# Patient Record
Sex: FEMALE | Race: WHITE | NOT HISPANIC OR LATINO | Employment: UNEMPLOYED | URBAN - METROPOLITAN AREA
[De-identification: names, ages, dates, MRNs, and addresses within clinical notes are randomized per-mention and may not be internally consistent; named-entity substitution may affect disease eponyms.]

---

## 2021-07-17 ENCOUNTER — HOSPITAL ENCOUNTER (EMERGENCY)
Facility: CLINIC | Age: 56
Discharge: HOME | End: 2021-07-17
Attending: STUDENT IN AN ORGANIZED HEALTH CARE EDUCATION/TRAINING PROGRAM | Admitting: STUDENT IN AN ORGANIZED HEALTH CARE EDUCATION/TRAINING PROGRAM
Payer: COMMERCIAL

## 2021-07-17 VITALS
DIASTOLIC BLOOD PRESSURE: 76 MMHG | SYSTOLIC BLOOD PRESSURE: 121 MMHG | BODY MASS INDEX: 33.31 KG/M2 | HEIGHT: 63 IN | TEMPERATURE: 99.3 F | OXYGEN SATURATION: 97 % | HEART RATE: 85 BPM | RESPIRATION RATE: 16 BRPM | WEIGHT: 188 LBS

## 2021-07-17 DIAGNOSIS — L50.9 WHEAL: ICD-10-CM

## 2021-07-17 PROCEDURE — 250N000012 HC RX MED GY IP 250 OP 636 PS 637: Performed by: STUDENT IN AN ORGANIZED HEALTH CARE EDUCATION/TRAINING PROGRAM

## 2021-07-17 PROCEDURE — 99283 EMERGENCY DEPT VISIT LOW MDM: CPT

## 2021-07-17 PROCEDURE — 250N000013 HC RX MED GY IP 250 OP 250 PS 637: Performed by: STUDENT IN AN ORGANIZED HEALTH CARE EDUCATION/TRAINING PROGRAM

## 2021-07-17 RX ORDER — PREDNISONE 20 MG/1
60 TABLET ORAL DAILY
Qty: 12 TABLET | Refills: 0 | Status: SHIPPED | OUTPATIENT
Start: 2021-07-17 | End: 2021-07-21

## 2021-07-17 RX ORDER — FAMOTIDINE 10 MG
10 TABLET ORAL ONCE
Status: COMPLETED | OUTPATIENT
Start: 2021-07-17 | End: 2021-07-17

## 2021-07-17 RX ORDER — PREDNISONE 20 MG/1
60 TABLET ORAL ONCE
Status: COMPLETED | OUTPATIENT
Start: 2021-07-17 | End: 2021-07-17

## 2021-07-17 RX ADMIN — PREDNISONE 60 MG: 20 TABLET ORAL at 01:02

## 2021-07-17 RX ADMIN — FAMOTIDINE 10 MG: 10 TABLET, FILM COATED ORAL at 01:02

## 2021-07-17 ASSESSMENT — MIFFLIN-ST. JEOR: SCORE: 1416.89

## 2021-07-17 NOTE — ED PROVIDER NOTES
"  Emergency Department Encounter         FINAL IMPRESSION:  Lip paresthesias, local skin reaction        ED COURSE AND MEDICAL DECISION MAKING   12:37 PM I met with patient to gather history and perform initial exam. ED course and treatment plan was discussed.  12:54 AM I discussed plans for discharge. Patient was agreeable with plan.    ED Course as of Jul 17 0053   Sat Jul 17, 2021   0049 55-year-old female here with concerns of allergic reaction.  Patient received a right anterior abdominal Trulicity shot on Wednesday.  States tonight when she went down to bed she felt like her tongue was painful, her throat was scratchy, she had lip tingling and she noticed a red blotchy area where she was injected on Wednesday.  No vomiting.  No abdominal pain.  Tolerating p.o.  No voice changes.  On arrival she looks well.  Vitals are stable.  Physical examination revealing a small blotchy area to her right abdomen where patient states she was injected.  No other hive-like lesions appreciated on physical examination.  Oral examination normal.  Patient states she feels like \"my tongue is on fire.\"  I do not appreciate any findings in her tongue.  Her lips are normal.  Uvula normal.  No geographic tongue.  Heart and lungs normal.  Abdomen benign.  Unsure was causing patient symptoms.  Does not appear to be in anaphylactic shock.  No swelling.  No other skin findings.  Plan for prednisone, Pepcid and discharge home            -Patient was given prednisone and Pepcid here.  Sent home with 4 more days of prednisone.  As stated above, patient has no signs or symptoms that would suggest acute anaphylactic reaction.  Shows no signs of facial edema or stridor or drooling.  Abdomen is benign other than a small 2 x 2 area of redness suggesting a wheal near the site of injection.  No abscess or induration or signs of cellulitis around the area.  -Abdominal pain or vomiting that suggest indolent allergic reaction.              At the " "conclusion of the encounter I discussed the results of all the tests and the disposition. The questions were answered. The patient or family acknowledged understanding and was agreeable with the care plan.                  MEDICATIONS GIVEN IN THE EMERGENCY DEPARTMENT:  Medications   famotidine (PEPCID) tablet 10 mg (has no administration in time range)   predniSONE (DELTASONE) tablet 60 mg (has no administration in time range)       NEW PRESCRIPTIONS STARTED AT TODAY'S ED VISIT:  New Prescriptions    No medications on file       HPI     Patient information obtained from: patient     Use of Interpretor: N/A     Anastacio Powers is a 55 year old female with no pertinent history who presents to this ED via walk in for evaluation of allergic reaction.    Patient reports developing rash on abdomen, cough and dryness in mouth and throat this morning. Prior to today, she received a Trulicity injection on 7/14. She had woken up with her throat and mouth \"feeling raw\", similar to a sore throat. Notes tingling in her lips. States that she has dentures and had to take them off due to her gums swelling. Denies nausea, vomiting, abdominal pain. Denies any urinary symptoms. No other complaints at this time.       REVIEW OF SYSTEMS:  Review of Systems   Constitutional: Negative for fever, malaise  HEENT: Negative runny nose, ear pain, neck pain. Positive for sore throat, swollen lips along with tingling, dry mouth and throat.  Respiratory: Negative for shortness of breath, cough, congestion  Cardiovascular: Negative for chest pain, leg edema  Gastrointestinal: Negative for abdominal distention, abdominal pain, constipation, vomiting, nausea, diarrhea  Genitourinary: Negative for dysuria and hematuria.   Integument: Negative skin breakdown. Positive for rash.  Neurological: Negative for paresthesias, weakness, headache.  Musculoskeletal: Negative for joint pain, joint swelling      All other systems reviewed and are " "negative.          MEDICAL HISTORY     History reviewed. No pertinent past medical history.    History reviewed. No pertinent surgical history.    Social History     Tobacco Use     Smoking status: None   Substance Use Topics     Alcohol use: None     Drug use: None       No current outpatient medications on file.          PHYSICAL EXAM     /76   Pulse 85   Temp 99.3  F (37.4  C) (Oral)   Resp 16   Ht 1.6 m (5' 3\")   Wt 85.3 kg (188 lb)   SpO2 97%   Breastfeeding No   BMI 33.30 kg/m        PHYSICAL EXAM:     General: Patient appears well, nontoxic, comfortable  HEENT: Moist mucous membranes, no tongue swelling.  No head trauma.  No midline neck pain.  Tongue is overall normal.  Do not appreciate any ulcerations, no geographic tongue.  No tongue swelling., no uvular deviation, no tonsillar asymmetry, no stridor, no drooling, no exudates, no redness  Cardiovascular: Normal rate, normal rhythm, no extremity edema.  No appreciable murmur.  Respiratory: No signs of respiratory distress, lungs are clear to auscultation bilaterally with no wheezes rhonchi or rales.  Abdominal: Soft, nontender, nondistended, no palpable masses, no guarding, no rebound.  2 x 2 minimal redness suggesting injection site reaction.  No palpable edema or induration or abscess.  No cellulitis.  Musculoskeletal: Full range of motion of joints, no deformities appreciated.  Neurological: Alert and oriented, grossly neurologically intact.  Psychological: Normal affect and mood.  Integument: No rashes appreciated          RESULTS       Labs Ordered and Resulted from Time of ED Arrival Up to the Time of Departure from the ED - No data to display    No orders to display                     PROCEDURES:  Procedures:  Procedures       I, Mercedez Hyatt am serving as a scribe to document services personally performed by Duglas Isbell DO, based on my observations and the provider's statements to me.  IDuglas DO, attest that Mercedez Hyatt is acting " in a scribe capacity, has observed my performance of the services and has documented them in accordance with my direction.    Duglas Isbell DO  Emergency Medicine  [facility] EMERGENCY ROOM     Ohl, Duglas Armstrong DO  07/17/21 0123

## 2021-07-17 NOTE — ED TRIAGE NOTES
"Pt reports receiving a Trulicity injection on Wednesday 7/14. PT now \"having rash on my abdomen, dryness in the mouth, red bumps on my tongue, my gums and mouth feel sore. \"  States that these are symptoms of allergic reaction as listed on the medication info sheet.  Pt able to speak in full sentences and has no airway disability  "

## 2023-10-21 ENCOUNTER — APPOINTMENT (OUTPATIENT)
Dept: ULTRASOUND IMAGING | Facility: CLINIC | Age: 58
End: 2023-10-21
Attending: EMERGENCY MEDICINE
Payer: COMMERCIAL

## 2023-10-21 ENCOUNTER — ANESTHESIA EVENT (OUTPATIENT)
Dept: SURGERY | Facility: CLINIC | Age: 58
End: 2023-10-21
Payer: COMMERCIAL

## 2023-10-21 ENCOUNTER — APPOINTMENT (OUTPATIENT)
Dept: CT IMAGING | Facility: CLINIC | Age: 58
End: 2023-10-21
Attending: EMERGENCY MEDICINE
Payer: COMMERCIAL

## 2023-10-21 ENCOUNTER — HOSPITAL ENCOUNTER (OUTPATIENT)
Facility: CLINIC | Age: 58
Setting detail: OBSERVATION
Discharge: HOME | End: 2023-10-23
Attending: EMERGENCY MEDICINE | Admitting: INTERNAL MEDICINE
Payer: COMMERCIAL

## 2023-10-21 DIAGNOSIS — K81.0 ACUTE CHOLECYSTITIS: Primary | ICD-10-CM

## 2023-10-21 DIAGNOSIS — N20.1 URETEROLITHIASIS: ICD-10-CM

## 2023-10-21 DIAGNOSIS — N13.2 HYDRONEPHROSIS WITH URINARY OBSTRUCTION DUE TO URETERAL CALCULUS: ICD-10-CM

## 2023-10-21 DIAGNOSIS — N20.1 URETERAL STONE: ICD-10-CM

## 2023-10-21 DIAGNOSIS — K80.20 SYMPTOMATIC CHOLELITHIASIS: ICD-10-CM

## 2023-10-21 LAB
ALBUMIN SERPL BCG-MCNC: 4.4 G/DL (ref 3.5–5.2)
ALBUMIN UR-MCNC: 20 MG/DL
ALP SERPL-CCNC: 94 U/L (ref 35–104)
ALT SERPL W P-5'-P-CCNC: 22 U/L (ref 0–50)
ANION GAP SERPL CALCULATED.3IONS-SCNC: 9 MMOL/L (ref 7–15)
APPEARANCE UR: ABNORMAL
AST SERPL W P-5'-P-CCNC: 21 U/L (ref 0–45)
BASO+EOS+MONOS # BLD AUTO: ABNORMAL 10*3/UL
BASO+EOS+MONOS NFR BLD AUTO: ABNORMAL %
BASOPHILS # BLD AUTO: 0.1 10E3/UL (ref 0–0.2)
BASOPHILS NFR BLD AUTO: 1 %
BILIRUB SERPL-MCNC: 0.2 MG/DL
BILIRUB UR QL STRIP: NEGATIVE
BUN SERPL-MCNC: 16.3 MG/DL (ref 6–20)
CALCIUM SERPL-MCNC: 9.5 MG/DL (ref 8.6–10)
CHLORIDE SERPL-SCNC: 104 MMOL/L (ref 98–107)
COLOR UR AUTO: YELLOW
CREAT SERPL-MCNC: 0.81 MG/DL (ref 0.51–0.95)
DEPRECATED HCO3 PLAS-SCNC: 26 MMOL/L (ref 22–29)
EGFRCR SERPLBLD CKD-EPI 2021: 84 ML/MIN/1.73M2
EOSINOPHIL # BLD AUTO: 0.2 10E3/UL (ref 0–0.7)
EOSINOPHIL NFR BLD AUTO: 3 %
ERYTHROCYTE [DISTWIDTH] IN BLOOD BY AUTOMATED COUNT: 13.9 % (ref 10–15)
GLUCOSE SERPL-MCNC: 77 MG/DL (ref 70–99)
GLUCOSE UR STRIP-MCNC: NEGATIVE MG/DL
HCT VFR BLD AUTO: 38.3 % (ref 35–47)
HGB BLD-MCNC: 11.9 G/DL (ref 11.7–15.7)
HGB UR QL STRIP: ABNORMAL
IMM GRANULOCYTES # BLD: 0 10E3/UL
IMM GRANULOCYTES NFR BLD: 0 %
KETONES UR STRIP-MCNC: NEGATIVE MG/DL
LEUKOCYTE ESTERASE UR QL STRIP: ABNORMAL
LIPASE SERPL-CCNC: 26 U/L (ref 13–60)
LYMPHOCYTES # BLD AUTO: 1.4 10E3/UL (ref 0.8–5.3)
LYMPHOCYTES NFR BLD AUTO: 24 %
MCH RBC QN AUTO: 26 PG (ref 26.5–33)
MCHC RBC AUTO-ENTMCNC: 31.1 G/DL (ref 31.5–36.5)
MCV RBC AUTO: 84 FL (ref 78–100)
MONOCYTES # BLD AUTO: 0.3 10E3/UL (ref 0–1.3)
MONOCYTES NFR BLD AUTO: 5 %
MUCOUS THREADS #/AREA URNS LPF: PRESENT /LPF
NEUTROPHILS # BLD AUTO: 4 10E3/UL (ref 1.6–8.3)
NEUTROPHILS NFR BLD AUTO: 67 %
NITRATE UR QL: NEGATIVE
NRBC # BLD AUTO: 0 10E3/UL
NRBC BLD AUTO-RTO: 0 /100
PH UR STRIP: 5 [PH] (ref 5–7)
PLATELET # BLD AUTO: 326 10E3/UL (ref 150–450)
POTASSIUM SERPL-SCNC: 3.8 MMOL/L (ref 3.4–5.3)
PROT SERPL-MCNC: 7.3 G/DL (ref 6.4–8.3)
RBC # BLD AUTO: 4.57 10E6/UL (ref 3.8–5.2)
RBC URINE: 93 /HPF
SODIUM SERPL-SCNC: 139 MMOL/L (ref 135–145)
SP GR UR STRIP: 1.03 (ref 1–1.03)
SQUAMOUS EPITHELIAL: <1 /HPF
URATE CRY #/AREA URNS HPF: ABNORMAL /HPF
UROBILINOGEN UR STRIP-MCNC: <2 MG/DL
WBC # BLD AUTO: 5.9 10E3/UL (ref 4–11)
WBC URINE: 0 /HPF

## 2023-10-21 PROCEDURE — 96374 THER/PROPH/DIAG INJ IV PUSH: CPT | Mod: XU

## 2023-10-21 PROCEDURE — 250N000011 HC RX IP 250 OP 636: Mod: JZ | Performed by: INTERNAL MEDICINE

## 2023-10-21 PROCEDURE — 83690 ASSAY OF LIPASE: CPT | Performed by: EMERGENCY MEDICINE

## 2023-10-21 PROCEDURE — G0378 HOSPITAL OBSERVATION PER HR: HCPCS

## 2023-10-21 PROCEDURE — 99204 OFFICE O/P NEW MOD 45 MIN: CPT | Mod: 57 | Performed by: SURGERY

## 2023-10-21 PROCEDURE — 258N000003 HC RX IP 258 OP 636: Performed by: INTERNAL MEDICINE

## 2023-10-21 PROCEDURE — 250N000011 HC RX IP 250 OP 636: Mod: JZ | Performed by: EMERGENCY MEDICINE

## 2023-10-21 PROCEDURE — 99223 1ST HOSP IP/OBS HIGH 75: CPT | Performed by: INTERNAL MEDICINE

## 2023-10-21 PROCEDURE — 96376 TX/PRO/DX INJ SAME DRUG ADON: CPT | Mod: XU

## 2023-10-21 PROCEDURE — 80053 COMPREHEN METABOLIC PANEL: CPT | Performed by: EMERGENCY MEDICINE

## 2023-10-21 PROCEDURE — 76705 ECHO EXAM OF ABDOMEN: CPT

## 2023-10-21 PROCEDURE — 85025 COMPLETE CBC W/AUTO DIFF WBC: CPT | Performed by: EMERGENCY MEDICINE

## 2023-10-21 PROCEDURE — 96375 TX/PRO/DX INJ NEW DRUG ADDON: CPT

## 2023-10-21 PROCEDURE — 74176 CT ABD & PELVIS W/O CONTRAST: CPT

## 2023-10-21 PROCEDURE — 250N000013 HC RX MED GY IP 250 OP 250 PS 637: Performed by: INTERNAL MEDICINE

## 2023-10-21 PROCEDURE — 81001 URINALYSIS AUTO W/SCOPE: CPT | Performed by: EMERGENCY MEDICINE

## 2023-10-21 PROCEDURE — 36415 COLL VENOUS BLD VENIPUNCTURE: CPT | Performed by: EMERGENCY MEDICINE

## 2023-10-21 RX ORDER — HYDROMORPHONE HYDROCHLORIDE 1 MG/ML
0.5 INJECTION, SOLUTION INTRAMUSCULAR; INTRAVENOUS; SUBCUTANEOUS ONCE
Status: COMPLETED | OUTPATIENT
Start: 2023-10-21 | End: 2023-10-21

## 2023-10-21 RX ORDER — HYDROMORPHONE HCL IN WATER/PF 6 MG/30 ML
0.2 PATIENT CONTROLLED ANALGESIA SYRINGE INTRAVENOUS
Status: DISCONTINUED | OUTPATIENT
Start: 2023-10-21 | End: 2023-10-23

## 2023-10-21 RX ORDER — ONDANSETRON 4 MG/1
4 TABLET, ORALLY DISINTEGRATING ORAL EVERY 6 HOURS PRN
Status: DISCONTINUED | OUTPATIENT
Start: 2023-10-21 | End: 2023-10-23 | Stop reason: HOSPADM

## 2023-10-21 RX ORDER — DULOXETIN HYDROCHLORIDE 60 MG/1
60 CAPSULE, DELAYED RELEASE ORAL AT BEDTIME
COMMUNITY
Start: 2023-09-12

## 2023-10-21 RX ORDER — POLYETHYLENE GLYCOL 3350 17 G/17G
17 POWDER, FOR SOLUTION ORAL DAILY PRN
Status: DISCONTINUED | OUTPATIENT
Start: 2023-10-21 | End: 2023-10-23 | Stop reason: HOSPADM

## 2023-10-21 RX ORDER — ACETAMINOPHEN 325 MG/1
650 TABLET ORAL EVERY 6 HOURS PRN
Status: DISCONTINUED | OUTPATIENT
Start: 2023-10-21 | End: 2023-10-23 | Stop reason: HOSPADM

## 2023-10-21 RX ORDER — TAMSULOSIN HYDROCHLORIDE 0.4 MG/1
0.4 CAPSULE ORAL DAILY
Status: DISCONTINUED | OUTPATIENT
Start: 2023-10-21 | End: 2023-10-23 | Stop reason: HOSPADM

## 2023-10-21 RX ORDER — ALPRAZOLAM 0.25 MG
.125-.25 TABLET ORAL
COMMUNITY

## 2023-10-21 RX ORDER — CYCLOBENZAPRINE HCL 5 MG
10 TABLET ORAL AT BEDTIME
COMMUNITY

## 2023-10-21 RX ORDER — GABAPENTIN 300 MG/1
600 CAPSULE ORAL 2 TIMES DAILY
Status: DISCONTINUED | OUTPATIENT
Start: 2023-10-21 | End: 2023-10-23 | Stop reason: HOSPADM

## 2023-10-21 RX ORDER — METOPROLOL SUCCINATE 50 MG/1
50 TABLET, EXTENDED RELEASE ORAL AT BEDTIME
COMMUNITY
Start: 2023-08-29

## 2023-10-21 RX ORDER — PROCHLORPERAZINE MALEATE 10 MG
10 TABLET ORAL EVERY 6 HOURS PRN
Status: DISCONTINUED | OUTPATIENT
Start: 2023-10-21 | End: 2023-10-23 | Stop reason: HOSPADM

## 2023-10-21 RX ORDER — CEFTRIAXONE 1 G/1
1 INJECTION, POWDER, FOR SOLUTION INTRAMUSCULAR; INTRAVENOUS EVERY 24 HOURS
Status: DISCONTINUED | OUTPATIENT
Start: 2023-10-21 | End: 2023-10-22

## 2023-10-21 RX ORDER — PROCHLORPERAZINE 25 MG
25 SUPPOSITORY, RECTAL RECTAL EVERY 12 HOURS PRN
Status: DISCONTINUED | OUTPATIENT
Start: 2023-10-21 | End: 2023-10-23 | Stop reason: HOSPADM

## 2023-10-21 RX ORDER — KETOROLAC TROMETHAMINE 15 MG/ML
15 INJECTION, SOLUTION INTRAMUSCULAR; INTRAVENOUS ONCE
Status: COMPLETED | OUTPATIENT
Start: 2023-10-21 | End: 2023-10-21

## 2023-10-21 RX ORDER — ALPRAZOLAM 0.5 MG/1
.125-.25 TABLET, EXTENDED RELEASE ORAL
Status: DISCONTINUED | OUTPATIENT
Start: 2023-10-21 | End: 2023-10-23 | Stop reason: HOSPADM

## 2023-10-21 RX ORDER — HYDROMORPHONE HCL IN WATER/PF 6 MG/30 ML
0.4 PATIENT CONTROLLED ANALGESIA SYRINGE INTRAVENOUS
Status: DISCONTINUED | OUTPATIENT
Start: 2023-10-21 | End: 2023-10-23

## 2023-10-21 RX ORDER — DULAGLUTIDE 3 MG/.5ML
3 INJECTION, SOLUTION SUBCUTANEOUS WEEKLY
COMMUNITY

## 2023-10-21 RX ORDER — ACETAMINOPHEN 650 MG/1
650 SUPPOSITORY RECTAL EVERY 6 HOURS PRN
Status: DISCONTINUED | OUTPATIENT
Start: 2023-10-21 | End: 2023-10-23 | Stop reason: HOSPADM

## 2023-10-21 RX ORDER — CYCLOBENZAPRINE HCL 10 MG
10 TABLET ORAL AT BEDTIME
Status: DISCONTINUED | OUTPATIENT
Start: 2023-10-21 | End: 2023-10-23 | Stop reason: HOSPADM

## 2023-10-21 RX ORDER — KETOROLAC TROMETHAMINE 15 MG/ML
15 INJECTION, SOLUTION INTRAMUSCULAR; INTRAVENOUS EVERY 6 HOURS PRN
Status: DISCONTINUED | OUTPATIENT
Start: 2023-10-21 | End: 2023-10-23 | Stop reason: HOSPADM

## 2023-10-21 RX ORDER — GABAPENTIN 300 MG/1
2 CAPSULE ORAL 2 TIMES DAILY
COMMUNITY
Start: 2023-03-27

## 2023-10-21 RX ORDER — ONDANSETRON 2 MG/ML
4 INJECTION INTRAMUSCULAR; INTRAVENOUS ONCE
Status: COMPLETED | OUTPATIENT
Start: 2023-10-21 | End: 2023-10-21

## 2023-10-21 RX ORDER — DULOXETIN HYDROCHLORIDE 60 MG/1
60 CAPSULE, DELAYED RELEASE ORAL AT BEDTIME
Status: DISCONTINUED | OUTPATIENT
Start: 2023-10-21 | End: 2023-10-23 | Stop reason: HOSPADM

## 2023-10-21 RX ORDER — METOPROLOL SUCCINATE 50 MG/1
50 TABLET, EXTENDED RELEASE ORAL AT BEDTIME
Status: DISCONTINUED | OUTPATIENT
Start: 2023-10-21 | End: 2023-10-23 | Stop reason: HOSPADM

## 2023-10-21 RX ORDER — PHENTERMINE HYDROCHLORIDE 37.5 MG/1
37.5 TABLET ORAL
COMMUNITY
Start: 2023-09-26

## 2023-10-21 RX ORDER — ONDANSETRON 2 MG/ML
4 INJECTION INTRAMUSCULAR; INTRAVENOUS EVERY 6 HOURS PRN
Status: DISCONTINUED | OUTPATIENT
Start: 2023-10-21 | End: 2023-10-23 | Stop reason: HOSPADM

## 2023-10-21 RX ORDER — SODIUM CHLORIDE 9 MG/ML
INJECTION, SOLUTION INTRAVENOUS CONTINUOUS
Status: DISCONTINUED | OUTPATIENT
Start: 2023-10-21 | End: 2023-10-22

## 2023-10-21 RX ADMIN — CYCLOBENZAPRINE 10 MG: 10 TABLET, FILM COATED ORAL at 22:13

## 2023-10-21 RX ADMIN — METOPROLOL SUCCINATE 50 MG: 50 TABLET, EXTENDED RELEASE ORAL at 22:13

## 2023-10-21 RX ADMIN — HYDROMORPHONE HYDROCHLORIDE 0.4 MG: 0.2 INJECTION, SOLUTION INTRAMUSCULAR; INTRAVENOUS; SUBCUTANEOUS at 17:28

## 2023-10-21 RX ADMIN — KETOROLAC TROMETHAMINE 15 MG: 15 INJECTION INTRAMUSCULAR; INTRAVENOUS at 22:21

## 2023-10-21 RX ADMIN — HYDROMORPHONE HYDROCHLORIDE 0.5 MG: 1 INJECTION, SOLUTION INTRAMUSCULAR; INTRAVENOUS; SUBCUTANEOUS at 13:21

## 2023-10-21 RX ADMIN — HYDROMORPHONE HYDROCHLORIDE 0.4 MG: 0.2 INJECTION, SOLUTION INTRAMUSCULAR; INTRAVENOUS; SUBCUTANEOUS at 23:42

## 2023-10-21 RX ADMIN — CEFTRIAXONE 1 G: 1 INJECTION, POWDER, FOR SOLUTION INTRAMUSCULAR; INTRAVENOUS at 18:39

## 2023-10-21 RX ADMIN — GABAPENTIN 600 MG: 300 CAPSULE ORAL at 19:17

## 2023-10-21 RX ADMIN — SODIUM CHLORIDE: 9 INJECTION, SOLUTION INTRAVENOUS at 18:34

## 2023-10-21 RX ADMIN — KETOROLAC TROMETHAMINE 15 MG: 15 INJECTION, SOLUTION INTRAMUSCULAR; INTRAVENOUS at 15:54

## 2023-10-21 RX ADMIN — HYDROMORPHONE HYDROCHLORIDE 0.4 MG: 0.2 INJECTION, SOLUTION INTRAMUSCULAR; INTRAVENOUS; SUBCUTANEOUS at 21:26

## 2023-10-21 RX ADMIN — ONDANSETRON 4 MG: 2 INJECTION INTRAMUSCULAR; INTRAVENOUS at 13:21

## 2023-10-21 RX ADMIN — TAMSULOSIN HYDROCHLORIDE 0.4 MG: 0.4 CAPSULE ORAL at 18:38

## 2023-10-21 RX ADMIN — DULOXETINE 60 MG: 60 CAPSULE, DELAYED RELEASE ORAL at 22:13

## 2023-10-21 ASSESSMENT — ENCOUNTER SYMPTOMS
DIARRHEA: 0
VOMITING: 0
FEVER: 0
ABDOMINAL PAIN: 1
NAUSEA: 1
BACK PAIN: 0
SHORTNESS OF BREATH: 0
CONSTIPATION: 0
HEMATURIA: 0
DYSURIA: 0
CHILLS: 0

## 2023-10-21 ASSESSMENT — ACTIVITIES OF DAILY LIVING (ADL)
ADLS_ACUITY_SCORE: 35
ADLS_ACUITY_SCORE: 33
ADLS_ACUITY_SCORE: 35
ADLS_ACUITY_SCORE: 35

## 2023-10-21 NOTE — CONSULTS
General Surgery Consultation  Anastacio Powers MRN# [id number]   Age/Sex: 57 year old female YOB: 1965     Reason for consult: Abdomen pain       Requesting physician: Dr. Alva                    Assessment and Plan:   Assessment:  Abdominal pain  Acute cholecystitis versus symptomatic cholelithiasis questionable  UTI  History of PVCs  History of abdominal adhesions status post multiple lysis of adhesion in the past    57-year-old female presenting with abdominal pain located in the upper quadrants of the abdomen.  It is likely that this is a symptomatic cholelithiasis versus an early acute cholecystitis given the positive Abdul sign.  Given that the patient cannot tolerate any p.o. diet without nausea and vomiting, would recommend cholecystectomy.    Plan:  -If all work-up is negative, recommending 2 OR tomorrow for robotic cholecystectomy.  -If the patient has negative work-up, she can have clear liquid diet until midnight from the general surgery standpoint.  N.p.o. after that for preparation for surgery tomorrow.  -The risks and benefits of the procedure were explained detail to the patient. The risks include infection, bleeding, damage to the surrounding structures. Patient verbalized understanding provided consent to undergo the procedure above.  -Patient will require medical clearance due to her history of PVCs.  -Admit to medicine  -General surgery team following          Chief Complaint:     Chief Complaint   Patient presents with    Abdominal Pain    Nausea        History is obtained from the patient    HPI:   Anastacio Powers is a 57 year old female who presents to the ED with complaints of abdominal pain.  Patient states that she ate a banana and started to have abdominal pain.  Says that this is the first abdominal pain in the upper quadrant for the patient.  The abdominal pain is still persistent.  Patient unable to tolerate any p.o. intake.  Patient has had gynecological surgery in the  past.  She has also had abdominal adhesions formed in the abdomen due to the gynecological surgeries.  Patient has had lysis of adhesion of the abdomen as well.    No fevers no chills.  No chest pain shortness of breath.  Patient has a history of PVCs in which she takes metoprolol.          Past Medical History:   History reviewed. No pertinent past medical history.           Past Surgical History:   History reviewed. No pertinent surgical history.          Social History:               Family History:   No family history on file.           Allergies:   No Known Allergies           Medications:     Prior to Admission medications    Medication Sig Start Date End Date Taking? Authorizing Provider   ALPRAZolam (XANAX) 0.25 MG tablet Take 0.125-0.25 mg by mouth nightly as needed for anxiety   Yes Unknown, Entered By History   cyclobenzaprine (FLEXERIL) 5 MG tablet Take 10 mg by mouth at bedtime   Yes Unknown, Entered By History   DULoxetine (CYMBALTA) 60 MG capsule Take 60 mg by mouth at bedtime 9/12/23  Yes Unknown, Entered By History   gabapentin (NEURONTIN) 300 MG capsule Take 2 capsules by mouth 2 times daily 3/27/23  Yes Unknown, Entered By History   metoprolol succinate ER (TOPROL XL) 50 MG 24 hr tablet Take 50 mg by mouth at bedtime 8/29/23  Yes Unknown, Entered By History   phentermine (ADIPEX-P) 37.5 MG tablet Take 37.5 mg by mouth every morning (before breakfast) 9/26/23  Yes Unknown, Entered By History   TRULICITY 3 MG/0.5ML SOPN Inject 3 mg Subcutaneous once a week   Yes Unknown, Entered By History              Review of Systems:   The Review of Systems is negative other than noted in the HPI            Physical Exam:   Patient Vitals for the past 24 hrs:   BP Temp Temp src Pulse Resp SpO2 Weight   10/21/23 1500 116/71 98.6  F (37  C) Oral 74 16 97 % --   10/21/23 1445 125/77 -- -- 77 16 95 % --   10/21/23 1430 105/72 -- -- 77 16 100 % --   10/21/23 1415 117/80 -- -- 74 -- 98 % --   10/21/23 1400 119/80 --  -- 77 -- 100 % --   10/21/23 1251 117/77 97.6  F (36.4  C) Temporal 76 18 98 % 70.3 kg (155 lb)        No intake or output data in the 24 hours ending 10/21/23 1615   Constitutional:   awake, alert, cooperative, no apparent distress, and appears stated age       Eyes:   PERRL, conjunctiva/corneas clear, EOM's intact; no scleral edema or icterus noted        ENT:   Normocephalic, without obvious abnormality, atraumatic, Lips, mucosa, and tongue normal        Hematologic / Lymphatic:   No lymphadenopathy       Lungs:   Normal respiratory effort, no accessory muscle use       Cardiovascular:   Regular rate and rhythm       Abdomen:   Soft, nondistended, tender to palpation with positive Abdul sign       Musculoskeletal:   No obvious swelling, bruising or deformity       Skin:   Skin color and texture normal for patient, no rashes or lesions              Data:        All imaging studies reviewed by me.  I personally reviewed the imagings and agree with ultrasound shows gallstones.      Suleman Hyatt DO  General Surgeon  [facility]  Surgery [facility] - [address] [address]?  Office: [phone]  Employed by - [facility]  Pager: [phone]

## 2023-10-21 NOTE — ED TRIAGE NOTES
Pt presents to the ED with RUQ abdominal pain that started 45 minutes ago after eating. Pt still has gallbladder. Endorses nausea, no vomiting. Denies any fevers at home.      Triage Assessment (Adult)       Row Name 10/21/23 1257          Triage Assessment    Airway WDL WDL        Respiratory WDL    Respiratory WDL WDL        Skin Circulation/Temperature WDL    Skin Circulation/Temperature WDL WDL        Cardiac WDL    Cardiac WDL WDL        Peripheral/Neurovascular WDL    Peripheral Neurovascular WDL WDL        Cognitive/Neuro/Behavioral WDL    Cognitive/Neuro/Behavioral WDL WDL

## 2023-10-21 NOTE — ED PROVIDER NOTES
"EMERGENCY DEPARTMENT ENCOUNTER      NAME: Anastacio Powers  AGE: 57 year old female  YOB: 1965  MRN: [id number]  EVALUATION DATE & TIME: 10/21/2023 12:54 PM    PCP: Evelyn Tello    ED PROVIDER: Jeny Franco PA-C      Chief Complaint   Patient presents with    Abdominal Pain    Nausea         FINAL IMPRESSION:  1. Acute cholecystitis    2. Symptomatic cholelithiasis    3. Ureterolithiasis          ED COURSE & MEDICAL DECISION MAKING:    Pertinent Labs & Imaging studies reviewed. (See chart for details)    57 year old female presents to the Emergency Department for evaluation of abdominal pain.    Physical exam is remarkable for an uncomfortable appearing female.  Heart and lung sounds are clear diffusely throughout.  She has right upper quadrant tenderness to palpation, positive Abdul sign.  Remainder of abdomen is soft and nontender.  Vital signs are stable and she is afebrile.    CBC is unremarkable with no leukocytosis or anemia.  CMP is unremarkable with no significant electrolyte derangements, normal liver and kidney function. Lipase within normal limits. RUQ ultrasound is remarkable for cholelithiasis without evidence of cholecystitis.     Urinalysis was eventually collected as the patient reported to the RN that she thought she \"might be getting a UTI\" for the last few days. This shows no evidence of infection but there is some blood. A CT without contrast of the abdomen was collected which is remarkable for an obstructing 3 mm UVJ stone on the right side.     The patient was given IVF, zofran, dilaudid for treatment of her pain. She continued to have recurrent episodes of pain while here that were quite severe even without further oral intake. I discussed disposition with the patient and she does not feel that she could go home in her current condition due to the level of pain that she has and she expresses fear about eating/drinking since that seemed to set off her pain. I " discussed her case with the on-call surgeon Dr. Hyatt who evaluated the patient and will plan for cholecystectomy tomorrow. Though the patient was noted to have a right sided obstructing UVJ stone on the right side that is 3 mm-certainly that would explain why she feels she could be getting a UTI but I clarified with the patient where the severe pain is that brought her in and she confirms it is very focal in the RUQ so I do suspect the cholelithiasis is the source of her significant pain and the kidney stone is a secondary/incidental finding.     Patient admitted to hospital medicine. She is agreeable with this plan. Care discussed with staff physician Dr. Dinesh Alva who is in agreement with the treatment plan.     Medical Decision Making    History:  Supplemental history from: Documented in chart, if applicable  External Record(s) reviewed: Outpatient Record: Urgent care visit earlier today    Work Up:  Chart documentation includes differential considered and any EKGs or imaging independently interpreted by provider, where specified.  In additional to work up documented, I considered the following work up: Documented in chart, if applicable.    External consultation:  Discussion of management with another provider: General Surgery    Complicating factors:  Care impacted by chronic illness: N/A  Care affected by social determinants of health: N/A    Disposition considerations: Admit.    ED Course   1:04 PM Performed my initial history and physical exam. Discussed workup in the emergency department, management of symptoms, and likely disposition.   3:41 PM Updated with test results.   3:58 PM Discussed with Dr. Alva  4:07 PM Discussed with Dr. Suleman Hyatt with general surgery.  4:10 PM Patient reportedly told nursing staff that she was having some discomfort with urination-urinalysis just returned with blood so a non-con CT was added to evaluate for stone.   4:50 PM Updated patient with CT results.  5:01 PM Discussed  "with Dr. Contreras who agrees to admit the patient.    At the conclusion of the encounter I discussed the results of all of the tests and the disposition. The questions were answered. The patient or family acknowledged understanding and was agreeable with the care plan.     Voice recognition software was used in the creation of this note. Any grammatical or nonsensical errors are due to inherent errors with the software and are not the intention of the writer.     MEDICATIONS GIVEN IN THE EMERGENCY:  Medications   HYDROmorphone (PF) (DILAUDID) injection 0.5 mg (0.5 mg Intravenous $Given 10/21/23 1321)   ondansetron (ZOFRAN) injection 4 mg (4 mg Intravenous $Given 10/21/23 1321)   ketorolac (TORADOL) injection 15 mg (15 mg Intravenous $Given 10/21/23 2164)       NEW PRESCRIPTIONS STARTED AT TODAY'S ER VISIT  New Prescriptions    No medications on file            =================================================================    HPI    Patient information was obtained from: Patient    Use of : N/A         Anastacio Powers is a 57 year old female who presents to the ED via walk-in with her father for evaluation of abdominal pain.     The patient reports that about 45 minutes prior to arrival, she was standing in her driveway and had sudden onset of right upper quadrant abdominal pain.  She had eaten a banana just prior to onset of the pain.  She describes the pain as stabbing and rates it 10 out of 10.  The pain does not radiate anywhere.  She has never had pain like this before.  She has associated nausea.  She tried Pepto-Bismol and \"a stomach pill\" without any improvement.    She denies any vomiting, fevers, chills, diarrhea, hematuria, dysuria, chest pain, or shortness of breath.  She has no abdominal surgical history but notes her mother did need her gallbladder removed.      REVIEW OF SYSTEMS   Review of Systems   Constitutional:  Negative for chills and fever.   Respiratory:  Negative for shortness " of breath.    Cardiovascular:  Negative for chest pain.   Gastrointestinal:  Positive for abdominal pain and nausea. Negative for constipation, diarrhea and vomiting.   Genitourinary:  Negative for dysuria and hematuria.   Musculoskeletal:  Negative for back pain.       All other systems reviewed and are negative unless noted in HPI.      PAST MEDICAL HISTORY:  History reviewed. No pertinent past medical history.    PAST SURGICAL HISTORY:  History reviewed. No pertinent surgical history.    CURRENT MEDICATIONS:    ALPRAZolam (XANAX) 0.25 MG tablet  cyclobenzaprine (FLEXERIL) 5 MG tablet  DULoxetine (CYMBALTA) 60 MG capsule  gabapentin (NEURONTIN) 300 MG capsule  metoprolol succinate ER (TOPROL XL) 50 MG 24 hr tablet  phentermine (ADIPEX-P) 37.5 MG tablet  TRULICITY 3 MG/0.5ML SOPN        ALLERGIES:  No Known Allergies    FAMILY HISTORY:  No family history on file.    SOCIAL HISTORY:   Social History     Socioeconomic History    Marital status: Single       VITALS:  Patient Vitals for the past 24 hrs:   BP Temp Temp src Pulse Resp SpO2 Weight   10/21/23 1530 117/68 -- -- 76 16 99 % --   10/21/23 1500 116/71 98.6  F (37  C) Oral 74 16 97 % --   10/21/23 1445 125/77 -- -- 77 16 95 % --   10/21/23 1430 105/72 -- -- 77 16 100 % --   10/21/23 1415 117/80 -- -- 74 -- 98 % --   10/21/23 1400 119/80 -- -- 77 -- 100 % --   10/21/23 1251 117/77 97.6  F (36.4  C) Temporal 76 18 98 % 70.3 kg (155 lb)       PHYSICAL EXAM    VITAL SIGNS: /68   Pulse 76   Temp 98.6  F (37  C) (Oral)   Resp 16   Wt 70.3 kg (155 lb)   SpO2 99%   BMI 27.46 kg/m    General Appearance: Uncomfortable appearing; Alert, cooperative, normal speech and facial symmetry, appears stated age  Head:  Normocephalic, without obvious abnormality, atraumatic  Eyes: Conjunctiva/corneas clear, EOM's intact, no nystagmus, PERRL  ENT:  Lips, mucosa, and tongue normal; teeth and gums normal, no pharyngeal inflammation, no dysphonia or difficulty swallowing,  membranes are moist without pallor  Cardio:  Regular rate and rhythm, S1 and S2 normal, no murmur, rub    or gallop, 2+ pulses symmetric in all extremities  Pulm:  Clear to auscultation bilaterally, respirations unlabored with no accessory muscle use  Abdomen:  Active bowel sounds in all quadrants; ight upper quadrant tenderness to palpation, positive Abdul sign.  Remainder of abdomen is soft and nontender with no rebound or guarding.  Back: No midline tenderness or step-offs, no CVA tenderness  Extremities: Moves all extremities  Neuro: Patient is awake, alert, and responsive to voice. No gross motor weaknesses or sensory loss; moves all extremities.    LAB:  All pertinent labs reviewed and interpreted.  Labs Ordered and Resulted from Time of ED Arrival to Time of ED Departure   CBC WITH PLATELETS AND DIFFERENTIAL - Abnormal       Result Value    WBC Count 5.9      RBC Count 4.57      Hemoglobin 11.9      Hematocrit 38.3      MCV 84      MCH 26.0 (*)     MCHC 31.1 (*)     RDW 13.9      Platelet Count 326      % Neutrophils 67      % Lymphocytes 24      % Monocytes 5      Mids % (Monos, Eos, Basos)        % Eosinophils 3      % Basophils 1      % Immature Granulocytes 0      NRBCs per 100 WBC 0      Absolute Neutrophils 4.0      Absolute Lymphocytes 1.4      Absolute Monocytes 0.3      Mids Abs (Monos, Eos, Basos)        Absolute Eosinophils 0.2      Absolute Basophils 0.1      Absolute Immature Granulocytes 0.0      Absolute NRBCs 0.0     ROUTINE UA WITH MICROSCOPIC REFLEX TO CULTURE - Abnormal    Color Urine Yellow      Appearance Urine Turbid (*)     Glucose Urine Negative      Bilirubin Urine Negative      Ketones Urine Negative      Specific Gravity Urine 1.026      Blood Urine 0.2 mg/dL (*)     pH Urine 5.0      Protein Albumin Urine 20 (*)     Urobilinogen Urine <2.0      Nitrite Urine Negative      Leukocyte Esterase Urine 25 Dee/uL (*)     Mucus Urine Present (*)     Uric Acid Crystals Urine Few (*)      RBC Urine 93 (*)     WBC Urine 0      Squamous Epithelials Urine <1     COMPREHENSIVE METABOLIC PANEL - Normal    Sodium 139      Potassium 3.8      Carbon Dioxide (CO2) 26      Anion Gap 9      Urea Nitrogen 16.3      Creatinine 0.81      GFR Estimate 84      Calcium 9.5      Chloride 104      Glucose 77      Alkaline Phosphatase 94      AST 21      ALT 22      Protein Total 7.3      Albumin 4.4      Bilirubin Total 0.2     LIPASE - Normal    Lipase 26         RADIOLOGY:  Reviewed all pertinent imaging. Please see official radiology report.  CT Abdomen Pelvis w/o Contrast   Final Result   IMPRESSION:    1.  Obstructing 3 mm stone at the right ureterovesicular junction with mild proximal hydroureteronephrosis.   2.  Cholelithiasis without evidence of acute cholecystitis.         Abdomen US, limited (RUQ only)   Final Result   IMPRESSION:   Cholelithiasis without evidence of cholecystitis                  Jeny Franco PA-C  Emergency Medicine  [facility] EMERGENCY ROOM  [address] [facility] [id number] [phone]  Dept: [phone]       Jeny Franco PA-C  10/21/23 1707

## 2023-10-21 NOTE — H&P
[facility]    History and Physical - Hospitalist Service       Date of Admission:  10/21/2023    Assessment & Plan      Anastacio Powers is a 57 year old female with history of anxiety and depression, symptomatic PVCs, previous abdominal adhesions status post lysis of adhesion, admitted on 10/21/2023 for right upper quadrant abdominal pain.  CT imaging reveals right UVJ stone with mild proximal hydroureteronephrosis.  General surgery was consulted by ED provider.  Likely pain associated with right UVJ stone. Urology consultation.    Right abdominal pain  Right ureteral stone hydroureteronephrosis.  Microscopic hematuria  General surgery was consulted.  Will ask urology to evaluate.  Question whether symptoms are related to UVJ stone.  Continue ketorolac 15 mg IV every 6 hours as needed  Order Dilaudid 0.2 to 0.4 mg IV every 2 hours as needed.  Order ceftriaxone 1 g IV every 24 hours  Flomax 0.4 mg daily  NS 100cc/hr.   N.p.o. status    Cholelithiasis  CT and US without evidence of cholecystitis.   WBC normal.   General surgery to follow.     History of symptomatic PVCs  PTA medication: Metoprolol 50 mg at bedtime    History abdominal adhesions status post multiple lysis of adhesions in the past.    Anxiety and depression  PTA medication: Alprazolam 0.125 to 0.25 mg at bedtime as needed  Duloxetine 60 mg at bedtime  Gabapentin 600 mg twice daily    Overweight Body mass index is 27.46 kg/m .  Hold PTA medication: Trulicity and phentermine.     Observation Goals: -diagnostic tests and consults completed and resulted, -vital signs normal or at patient baseline, -tolerating oral intake to maintain hydration, -adequate pain control on oral analgesics, -returns to baseline functional status, -safe disposition plan has been identified, -Urology recommendations., Nurse to notify provider when observation goals have been met and patient is ready for discharge.  Diet: NPO for Medical/Clinical Reasons  Except for: Meds, Ice Chips    DVT Prophylaxis: Pneumatic Compression Devices  Mata Catheter: Not present  Lines: None     Cardiac Monitoring: None  Code Status: Full Code    Clinically Significant Risk Factors Present on Admission                                  Disposition Plan      Expected Discharge Date: 10/22/2023                  Mark Contreras DO  Hospitalist Service  [facility]  Securely message with Dat[employer] (more info)  Text page via [employer] Paging/Directory     ______________________________________________________________________    Chief Complaint   Abdominal pain    History is obtained from the patient    History of Present Illness   Anastacio Powers is a 57 year old female with history of abdominal adhesions, anxiety, depression, symptomatic PVCs, who presents with acute right sided abdominal pain, sharp in nature and unrelenting.  Denies radiation of pain.  Associated symptoms of nausea but no vomiting.  Denies constipation, hematochezia, melena, hematuria, chest pain, shortness of breath.  She tried taking Pepto Bismol at home without improvement in symptoms.  Denies recent sick contacts.      In the emergency department vital signs were stable.  Laboratory findings revealed normal BMP and CBC.  Urinalysis revealed small blood.  CT abdomen pelvis showed obstructing 3 mm stone at right UVJ with mild proximal hydroureteronephrosis.  Cholelithiasis was noted without evidence of acute cholecystitis.  Abdominal ultrasound was performed and revealed cholelithiasis without evidence of cholecystitis.  There was negative ultrasound Abdul sign however patient had received pain medication prior to study.  General surgery was consulted by ED provider.      Past Medical History    Premature ventricular contractions  Anxiety and Depression    Past Surgical History   Lysis of abdominal adhesions.    Prior to Admission Medications   Prior to Admission Medications   Prescriptions Last  Dose Informant Patient Reported? Taking?   ALPRAZolam (XANAX) 0.25 MG tablet More than a month at PRN  Yes Yes   Sig: Take 0.125-0.25 mg by mouth nightly as needed for anxiety   DULoxetine (CYMBALTA) 60 MG capsule 10/20/2023 at hs  Yes Yes   Sig: Take 60 mg by mouth at bedtime   TRULICITY 3 MG/0.5ML SOPN 10/16/2023 at mondays  Yes Yes   Sig: Inject 3 mg Subcutaneous once a week   cyclobenzaprine (FLEXERIL) 5 MG tablet 10/20/2023 at hs  Yes Yes   Sig: Take 10 mg by mouth at bedtime   gabapentin (NEURONTIN) 300 MG capsule 10/20/2023  Yes Yes   Sig: Take 2 capsules by mouth 2 times daily   metoprolol succinate ER (TOPROL XL) 50 MG 24 hr tablet 10/20/2023 at hs  Yes Yes   Sig: Take 50 mg by mouth at bedtime   phentermine (ADIPEX-P) 37.5 MG tablet 10/21/2023 at am  Yes Yes   Sig: Take 37.5 mg by mouth every morning (before breakfast)      Facility-Administered Medications: None        Review of Systems    The 10 point Review of Systems is negative other than noted in the HPI or here.     Social History   I have reviewed this patient's social history and updated it with pertinent information if needed.         Family History     No significant family history.      Allergies   No Known Allergies     Physical Exam   Vital Signs: Temp: 98.6  F (37  C) Temp src: Oral BP: 117/68 Pulse: 76   Resp: 16 SpO2: 99 % O2 Device: None (Room air)    Weight: 155 lbs 0 oz    General Appearance: Well nourished female.   Eyes: PERRL, EOMIx2.  HEENT: NC, AT, neck supple, trachea midline.   Back: No flank tenderness.  Respiratory: CTAB no wheezes rales or rhonchi.  Cardiovascular: RRR, no murmur bruit rub or gallop.  GI: Right sided abdominal pain to palpation.  Negative Abdul sign.   Skin: Warm dry, no exanthem.  Musculoskeletal: Moving all extremities spontaneously and ppurposefully.   Neurologic: Aox3, general sensation intact all extremities.   Psychiatric: Appropriate mood and affect.    Medical Decision Making       80 MINUTES SPENT BY  ME on the date of service doing chart review, history, exam, documentation & further activities per the note.      Data     I have personally reviewed the following data over the past 24 hrs:    5.9  \   11.9   / 326     139 104 16.3 /  77   3.8 26 0.81 \     ALT: 22 AST: 21 AP: 94 TBILI: 0.2   ALB: 4.4 TOT PROTEIN: 7.3 LIPASE: 26       Imaging results reviewed over the past 24 hrs:   Recent Results (from the past 24 hour(s))   Abdomen US, limited (RUQ only)    Narrative    EXAM: US ABDOMEN LIMITED  LOCATION: [facility]  DATE: 10/21/2023    INDICATION: RUQ pain after eating  COMPARISON: 10/29/2019  TECHNIQUE: Limited abdominal ultrasound.    FINDINGS:    GALLBLADDER: Multiple small stones. Otherwise normal. No wall thickening or pericholecystic free fluid. No focal tenderness, by sonographer report.    BILE DUCTS: No biliary dilatation. The common duct measures 6 mm.    LIVER: Mild diffuse fatty infiltration. 9 mm benign simple cyst.    RIGHT KIDNEY: No hydronephrosis.    PANCREAS: Normal where seen, though mostly obscured by bowel gas.    No ascites.      Impression    IMPRESSION:  Cholelithiasis without evidence of cholecystitis       CT Abdomen Pelvis w/o Contrast    Narrative    EXAM: CT ABDOMEN PELVIS W/O CONTRAST  LOCATION: [facility]  DATE: 10/21/2023    INDICATION: R flank pain  COMPARISON: CT 10/29/2019  TECHNIQUE: CT scan of the abdomen and pelvis was performed without IV contrast. Multiplanar reformats were obtained. Dose reduction techniques were used.  CONTRAST: None.    FINDINGS:   LOWER CHEST: Unremarkable.    HEPATOBILIARY: Stable simple-appearing left hepatic cyst, no specific follow-up recommended. Cholelithiasis without evidence of acute cholecystitis or biliary obstruction.    PANCREAS: Normal.    SPLEEN: Normal.    ADRENAL GLANDS: Normal.    KIDNEYS/BLADDER: 3 mm obstructing stone at the right ureterovesicular junction with mild proximal  hydroureteronephrosis. No nonobstructing renal stones bilaterally. Urinary bladder is decompressed but otherwise unremarkable.    BOWEL: No obstruction or inflammatory change. Normal appendix. Moderate volume formed stool in the colon.    LYMPH NODES: No suspicious lymphadenopathy.    VASCULATURE: Minimal calcified atherosclerosis.    PELVIC ORGANS: Hysterectomy.    MUSCULOSKELETAL: No acute bony abnormality. Prior fusion at L5/S1.        Impression    IMPRESSION:   1.  Obstructing 3 mm stone at the right ureterovesicular junction with mild proximal hydroureteronephrosis.  2.  Cholelithiasis without evidence of acute cholecystitis.

## 2023-10-21 NOTE — ED PROVIDER NOTES
Emergency Department Staff Physician Note     I had a face to face encounter with this patient seen by the Advanced Practice Provider (TEVIN).  I have seen, examined, and discussed the patient with the TEVIN and agree with their assessment and plan of management.    Relevant HPI:     Anastacio Powers is a 57 year old female with a history of MADELEINE and BSO who presents to the Emergency Department for evaluation of acute onset, stabbing, 10/10 right upper upper quadrant abdominal pain with associated nausea beginning approximately 45 minutes PTA after eating a banana. She has never had pain like this before. Patient took Pepto-Bismol without improvement. Patient otherwise denies fever, chills, vomiting, diarrhea, hematuria, dysuria, chest pain, or any other symptoms or concerns at this time.        I, Porsha Avila, am serving as a scribe to document services personally performed by Obie Alva D.O., based on my observations and the provider's statements to me.   I, Obie Alva D.O., attest that Porsha Avila was acting in a scribe capacity, has observed my performance of the services and has documented them in accordance with my direction.    ED Course:  3:56 PM I received the patient report from Jeny Franco PA-C. I agree with their assessment and plan of management, and I will see the patient.  4:00 PM I met with the patient to introduce myself, gather additional history, perform my initial exam, and discuss the plan.     Brief Physical Exam:  Vitals: /84 (BP Location: Left arm)   Pulse 87   Temp 98.3  F (36.8  C) (Oral)   Resp 16   Wt 70.3 kg (155 lb)   SpO2 98%   BMI 27.46 kg/m    General: Appears in no acute distress, awake, alert, interactive.  Eyes: Conjunctivae non-injected. Sclera anicteric.  HENT: Atraumatic, normocephalic  Neck: Supple, normal ROM  Respiratory/Chest: Respiration unlabored, no tachypnea  Abdomen: RUQ abdominal tenderness with rebound  Musculoskeletal: Normal extremities. No edema  or erythema.  Skin: Normal color. No rash or diaphoresis.  Neurologic: Alert and oriented, face symmetric, moves all extremities spontaneously. Speech clear.  Psychiatric:  Affect normal, Judgment normal, Mood normal.           Impression / ED Plan:  I had a face to face encounter with this patient seen by the Advanced Practice Provider (TEVIN).  I have seen, examined, and discussed the patient with the TEVIN and agree with their assessment and plan of management. I personally saw the patient and performed a substantive portion of the visit including all aspects of the medical decision making.    Anastacio Powers is a 57 year old female presents to the ED for evaluation of right upper quad abdominal pain.  On exam she was quite tender in the right upper quadrant.  Ultrasound was performed which did show acute cholelithiasis, but no definitive evidence of cholecystitis.  Symptoms appear to be consistent with symptomatic cholelithiasis.  Patient was evaluated by surgery.  After evaluation by surgery the patient informed us that she was also having some urinary symptoms, and we obtained a UA that did show blood in the urine.  Unfortunately additionally she does appear to have a right-sided urolithiasis however on exam I am still concerned about the gallstone.  Therefore at this time, plan is for admission with likely intervention for what we believed to be symptomatic cholelithiasis.    1. Acute cholecystitis    2. Symptomatic cholelithiasis    3. Ureterolithiasis        Obie Alva D.O.  Emergency Medicine  [facility] EMERGENCY ROOM  [address], [place] [address]  [phone]  Dept: [phone]      Obie Alva,   10/21/23 1939

## 2023-10-21 NOTE — PHARMACY-ADMISSION MEDICATION HISTORY
Pharmacy Intern Admission Medication History    Admission medication history is complete. The information provided in this note is only as accurate as the sources available at the time of the update.    Medication reconciliation/reorder completed by provider prior to medication history? No    Information Source(s): Patient and CareEverywhere/SureScripts via in-person      Changes made to PTA medication list:  Added: All the medications listed were added to the PTA Med List during this encounter.      Medication Affordability:   Not including over the counter (OTC) medications, was there a time in the past 3 months when you did not take your medications as prescribed because of cost?: No    Allergies reviewed with patient and updates made in EHR: yes    Medication available for use during hospital stay: None    Medication History Completed By: Teddy De Dios 10/21/2023 1:52 PM    PTA Med List   Medication Sig Note Last Dose    ALPRAZolam (XANAX) 0.25 MG tablet Take 0.125-0.25 mg by mouth nightly as needed for anxiety  More than a month at PRN    cyclobenzaprine (FLEXERIL) 5 MG tablet Take 10 mg by mouth at bedtime  10/20/2023 at hs    DULoxetine (CYMBALTA) 60 MG capsule Take 60 mg by mouth at bedtime  10/20/2023 at hs    gabapentin (NEURONTIN) 300 MG capsule Take 2 capsules by mouth 2 times daily  10/20/2023    metoprolol succinate ER (TOPROL XL) 50 MG 24 hr tablet Take 50 mg by mouth at bedtime  10/20/2023 at hs    phentermine (ADIPEX-P) 37.5 MG tablet Take 37.5 mg by mouth every morning (before breakfast)  10/21/2023 at am    TRULICITY 3 MG/0.5ML SOPN Inject 3 mg Subcutaneous once a week 10/21/2023: Takes on Mondays  10/16/2023 at mondays

## 2023-10-22 ENCOUNTER — ANESTHESIA (OUTPATIENT)
Dept: SURGERY | Facility: CLINIC | Age: 58
End: 2023-10-22
Payer: COMMERCIAL

## 2023-10-22 LAB
ANION GAP SERPL CALCULATED.3IONS-SCNC: 6 MMOL/L (ref 7–15)
BUN SERPL-MCNC: 18 MG/DL (ref 6–20)
CALCIUM SERPL-MCNC: 8.7 MG/DL (ref 8.6–10)
CHLORIDE SERPL-SCNC: 108 MMOL/L (ref 98–107)
CREAT SERPL-MCNC: 0.78 MG/DL (ref 0.51–0.95)
DEPRECATED HCO3 PLAS-SCNC: 25 MMOL/L (ref 22–29)
EGFRCR SERPLBLD CKD-EPI 2021: 88 ML/MIN/1.73M2
ERYTHROCYTE [DISTWIDTH] IN BLOOD BY AUTOMATED COUNT: 13.9 % (ref 10–15)
GLUCOSE BLDC GLUCOMTR-MCNC: 88 MG/DL (ref 70–99)
GLUCOSE SERPL-MCNC: 92 MG/DL (ref 70–99)
HCT VFR BLD AUTO: 34.9 % (ref 35–47)
HGB BLD-MCNC: 10.6 G/DL (ref 11.7–15.7)
HOLD SPECIMEN: NORMAL
MCH RBC QN AUTO: 25.8 PG (ref 26.5–33)
MCHC RBC AUTO-ENTMCNC: 30.4 G/DL (ref 31.5–36.5)
MCV RBC AUTO: 85 FL (ref 78–100)
PLATELET # BLD AUTO: 273 10E3/UL (ref 150–450)
PLATELET # BLD AUTO: 285 10E3/UL (ref 150–450)
POTASSIUM SERPL-SCNC: 4.4 MMOL/L (ref 3.4–5.3)
RBC # BLD AUTO: 4.11 10E6/UL (ref 3.8–5.2)
SODIUM SERPL-SCNC: 139 MMOL/L (ref 135–145)
WBC # BLD AUTO: 4 10E3/UL (ref 4–11)

## 2023-10-22 PROCEDURE — 82962 GLUCOSE BLOOD TEST: CPT

## 2023-10-22 PROCEDURE — 272N000001 HC OR GENERAL SUPPLY STERILE: Performed by: SURGERY

## 2023-10-22 PROCEDURE — 80048 BASIC METABOLIC PNL TOTAL CA: CPT | Performed by: INTERNAL MEDICINE

## 2023-10-22 PROCEDURE — 250N000009 HC RX 250: Performed by: SURGERY

## 2023-10-22 PROCEDURE — 96376 TX/PRO/DX INJ SAME DRUG ADON: CPT

## 2023-10-22 PROCEDURE — 85027 COMPLETE CBC AUTOMATED: CPT | Performed by: INTERNAL MEDICINE

## 2023-10-22 PROCEDURE — 250N000025 HC SEVOFLURANE, PER MIN: Performed by: SURGERY

## 2023-10-22 PROCEDURE — 258N000003 HC RX IP 258 OP 636

## 2023-10-22 PROCEDURE — 250N000013 HC RX MED GY IP 250 OP 250 PS 637: Performed by: INTERNAL MEDICINE

## 2023-10-22 PROCEDURE — 250N000011 HC RX IP 250 OP 636: Performed by: NURSE ANESTHETIST, CERTIFIED REGISTERED

## 2023-10-22 PROCEDURE — 250N000009 HC RX 250: Performed by: NURSE ANESTHETIST, CERTIFIED REGISTERED

## 2023-10-22 PROCEDURE — 36415 COLL VENOUS BLD VENIPUNCTURE: CPT | Performed by: SURGERY

## 2023-10-22 PROCEDURE — 999N000141 HC STATISTIC PRE-PROCEDURE NURSING ASSESSMENT: Performed by: SURGERY

## 2023-10-22 PROCEDURE — 250N000011 HC RX IP 250 OP 636: Mod: JZ | Performed by: INTERNAL MEDICINE

## 2023-10-22 PROCEDURE — 258N000003 HC RX IP 258 OP 636: Performed by: STUDENT IN AN ORGANIZED HEALTH CARE EDUCATION/TRAINING PROGRAM

## 2023-10-22 PROCEDURE — 710N000009 HC RECOVERY PHASE 1, LEVEL 1, PER MIN: Performed by: SURGERY

## 2023-10-22 PROCEDURE — 258N000003 HC RX IP 258 OP 636: Performed by: INTERNAL MEDICINE

## 2023-10-22 PROCEDURE — 360N000080 HC SURGERY LEVEL 7, PER MIN: Performed by: SURGERY

## 2023-10-22 PROCEDURE — 85049 AUTOMATED PLATELET COUNT: CPT | Mod: 91 | Performed by: SURGERY

## 2023-10-22 PROCEDURE — 250N000011 HC RX IP 250 OP 636: Mod: JZ | Performed by: STUDENT IN AN ORGANIZED HEALTH CARE EDUCATION/TRAINING PROGRAM

## 2023-10-22 PROCEDURE — 250N000011 HC RX IP 250 OP 636: Performed by: SURGERY

## 2023-10-22 PROCEDURE — 250N000011 HC RX IP 250 OP 636: Mod: JZ | Performed by: SURGERY

## 2023-10-22 PROCEDURE — 370N000017 HC ANESTHESIA TECHNICAL FEE, PER MIN: Performed by: SURGERY

## 2023-10-22 PROCEDURE — 88304 TISSUE EXAM BY PATHOLOGIST: CPT | Mod: TC | Performed by: SURGERY

## 2023-10-22 PROCEDURE — G0378 HOSPITAL OBSERVATION PER HR: HCPCS

## 2023-10-22 PROCEDURE — 99232 SBSQ HOSP IP/OBS MODERATE 35: CPT | Performed by: INTERNAL MEDICINE

## 2023-10-22 PROCEDURE — 250N000013 HC RX MED GY IP 250 OP 250 PS 637: Performed by: SURGERY

## 2023-10-22 PROCEDURE — 36415 COLL VENOUS BLD VENIPUNCTURE: CPT | Performed by: INTERNAL MEDICINE

## 2023-10-22 PROCEDURE — 47563 LAPARO CHOLECYSTECTOMY/GRAPH: CPT | Performed by: SURGERY

## 2023-10-22 PROCEDURE — 258N000003 HC RX IP 258 OP 636: Performed by: NURSE ANESTHETIST, CERTIFIED REGISTERED

## 2023-10-22 RX ORDER — ONDANSETRON 4 MG/1
4 TABLET, ORALLY DISINTEGRATING ORAL EVERY 30 MIN PRN
Status: DISCONTINUED | OUTPATIENT
Start: 2023-10-22 | End: 2023-10-22 | Stop reason: HOSPADM

## 2023-10-22 RX ORDER — ONDANSETRON 2 MG/ML
4 INJECTION INTRAMUSCULAR; INTRAVENOUS EVERY 30 MIN PRN
Status: CANCELLED | OUTPATIENT
Start: 2023-10-22

## 2023-10-22 RX ORDER — FENTANYL CITRATE 50 UG/ML
25 INJECTION, SOLUTION INTRAMUSCULAR; INTRAVENOUS EVERY 5 MIN PRN
Status: DISCONTINUED | OUTPATIENT
Start: 2023-10-22 | End: 2023-10-22 | Stop reason: HOSPADM

## 2023-10-22 RX ORDER — OXYCODONE HYDROCHLORIDE 5 MG/1
5 TABLET ORAL
Status: DISCONTINUED | OUTPATIENT
Start: 2023-10-22 | End: 2023-10-22 | Stop reason: HOSPADM

## 2023-10-22 RX ORDER — ONDANSETRON 4 MG/1
4 TABLET, ORALLY DISINTEGRATING ORAL EVERY 30 MIN PRN
Status: CANCELLED | OUTPATIENT
Start: 2023-10-22

## 2023-10-22 RX ORDER — SODIUM CHLORIDE, SODIUM LACTATE, POTASSIUM CHLORIDE, CALCIUM CHLORIDE 600; 310; 30; 20 MG/100ML; MG/100ML; MG/100ML; MG/100ML
INJECTION, SOLUTION INTRAVENOUS CONTINUOUS
Status: DISCONTINUED | OUTPATIENT
Start: 2023-10-22 | End: 2023-10-22 | Stop reason: HOSPADM

## 2023-10-22 RX ORDER — NALOXONE HYDROCHLORIDE 0.4 MG/ML
0.4 INJECTION, SOLUTION INTRAMUSCULAR; INTRAVENOUS; SUBCUTANEOUS
Status: DISCONTINUED | OUTPATIENT
Start: 2023-10-22 | End: 2023-10-23 | Stop reason: HOSPADM

## 2023-10-22 RX ORDER — OXYCODONE HYDROCHLORIDE 5 MG/1
10 TABLET ORAL
Status: DISCONTINUED | OUTPATIENT
Start: 2023-10-22 | End: 2023-10-22 | Stop reason: HOSPADM

## 2023-10-22 RX ORDER — DOCUSATE SODIUM 100 MG/1
100 CAPSULE, LIQUID FILLED ORAL 2 TIMES DAILY
Qty: 30 CAPSULE | Refills: 0 | Status: SHIPPED | OUTPATIENT
Start: 2023-10-22

## 2023-10-22 RX ORDER — CEFAZOLIN SODIUM/WATER 2 G/20 ML
2 SYRINGE (ML) INTRAVENOUS SEE ADMIN INSTRUCTIONS
Status: DISCONTINUED | OUTPATIENT
Start: 2023-10-22 | End: 2023-10-22 | Stop reason: HOSPADM

## 2023-10-22 RX ORDER — CEFAZOLIN SODIUM/WATER 2 G/20 ML
2 SYRINGE (ML) INTRAVENOUS
Status: COMPLETED | OUTPATIENT
Start: 2023-10-22 | End: 2023-10-22

## 2023-10-22 RX ORDER — LIDOCAINE HYDROCHLORIDE AND EPINEPHRINE 10; 10 MG/ML; UG/ML
INJECTION, SOLUTION INFILTRATION; PERINEURAL PRN
Status: DISCONTINUED | OUTPATIENT
Start: 2023-10-22 | End: 2023-10-22 | Stop reason: HOSPADM

## 2023-10-22 RX ORDER — LIDOCAINE 40 MG/G
CREAM TOPICAL
Status: DISCONTINUED | OUTPATIENT
Start: 2023-10-22 | End: 2023-10-23 | Stop reason: HOSPADM

## 2023-10-22 RX ORDER — NALOXONE HYDROCHLORIDE 0.4 MG/ML
0.2 INJECTION, SOLUTION INTRAMUSCULAR; INTRAVENOUS; SUBCUTANEOUS
Status: DISCONTINUED | OUTPATIENT
Start: 2023-10-22 | End: 2023-10-23 | Stop reason: HOSPADM

## 2023-10-22 RX ORDER — FENTANYL CITRATE 50 UG/ML
50 INJECTION, SOLUTION INTRAMUSCULAR; INTRAVENOUS EVERY 5 MIN PRN
Status: DISCONTINUED | OUTPATIENT
Start: 2023-10-22 | End: 2023-10-22 | Stop reason: HOSPADM

## 2023-10-22 RX ORDER — PROPOFOL 10 MG/ML
INJECTION, EMULSION INTRAVENOUS PRN
Status: DISCONTINUED | OUTPATIENT
Start: 2023-10-22 | End: 2023-10-22

## 2023-10-22 RX ORDER — ONDANSETRON 2 MG/ML
4 INJECTION INTRAMUSCULAR; INTRAVENOUS EVERY 30 MIN PRN
Status: DISCONTINUED | OUTPATIENT
Start: 2023-10-22 | End: 2023-10-22 | Stop reason: HOSPADM

## 2023-10-22 RX ORDER — HYDROMORPHONE HCL IN WATER/PF 6 MG/30 ML
0.4 PATIENT CONTROLLED ANALGESIA SYRINGE INTRAVENOUS EVERY 5 MIN PRN
Status: DISCONTINUED | OUTPATIENT
Start: 2023-10-22 | End: 2023-10-22 | Stop reason: HOSPADM

## 2023-10-22 RX ORDER — INDOCYANINE GREEN AND WATER 25 MG
2.5 KIT INJECTION ONCE
Qty: 25 MG | Refills: 0 | Status: COMPLETED | OUTPATIENT
Start: 2023-10-22 | End: 2023-10-22

## 2023-10-22 RX ORDER — HEPARIN SODIUM 5000 [USP'U]/.5ML
5000 INJECTION, SOLUTION INTRAVENOUS; SUBCUTANEOUS EVERY 8 HOURS
Status: DISCONTINUED | OUTPATIENT
Start: 2023-10-23 | End: 2023-10-23 | Stop reason: HOSPADM

## 2023-10-22 RX ORDER — HYDROCODONE BITARTRATE AND ACETAMINOPHEN 5; 325 MG/1; MG/1
1 TABLET ORAL EVERY 6 HOURS PRN
Qty: 10 TABLET | Refills: 0 | Status: SHIPPED | OUTPATIENT
Start: 2023-10-22 | End: 2023-10-25

## 2023-10-22 RX ORDER — OXYCODONE HYDROCHLORIDE 5 MG/1
5 TABLET ORAL
Status: CANCELLED | OUTPATIENT
Start: 2023-10-22

## 2023-10-22 RX ORDER — HYDROMORPHONE HCL IN WATER/PF 6 MG/30 ML
0.2 PATIENT CONTROLLED ANALGESIA SYRINGE INTRAVENOUS EVERY 5 MIN PRN
Status: DISCONTINUED | OUTPATIENT
Start: 2023-10-22 | End: 2023-10-22 | Stop reason: HOSPADM

## 2023-10-22 RX ORDER — DEXAMETHASONE SODIUM PHOSPHATE 4 MG/ML
INJECTION, SOLUTION INTRA-ARTICULAR; INTRALESIONAL; INTRAMUSCULAR; INTRAVENOUS; SOFT TISSUE PRN
Status: DISCONTINUED | OUTPATIENT
Start: 2023-10-22 | End: 2023-10-22

## 2023-10-22 RX ORDER — OXYCODONE HYDROCHLORIDE 5 MG/1
5 TABLET ORAL EVERY 6 HOURS PRN
Status: DISCONTINUED | OUTPATIENT
Start: 2023-10-22 | End: 2023-10-23 | Stop reason: HOSPADM

## 2023-10-22 RX ORDER — LIDOCAINE HYDROCHLORIDE 10 MG/ML
INJECTION, SOLUTION INFILTRATION; PERINEURAL PRN
Status: DISCONTINUED | OUTPATIENT
Start: 2023-10-22 | End: 2023-10-22

## 2023-10-22 RX ORDER — LIDOCAINE 40 MG/G
CREAM TOPICAL
Status: DISCONTINUED | OUTPATIENT
Start: 2023-10-22 | End: 2023-10-22 | Stop reason: HOSPADM

## 2023-10-22 RX ORDER — OXYCODONE HYDROCHLORIDE 5 MG/1
10 TABLET ORAL
Status: CANCELLED | OUTPATIENT
Start: 2023-10-22

## 2023-10-22 RX ORDER — HEPARIN SODIUM 5000 [USP'U]/.5ML
5000 INJECTION, SOLUTION INTRAVENOUS; SUBCUTANEOUS
Status: DISCONTINUED | OUTPATIENT
Start: 2023-10-22 | End: 2023-10-22 | Stop reason: HOSPADM

## 2023-10-22 RX ORDER — KETOROLAC TROMETHAMINE 30 MG/ML
INJECTION, SOLUTION INTRAMUSCULAR; INTRAVENOUS PRN
Status: DISCONTINUED | OUTPATIENT
Start: 2023-10-22 | End: 2023-10-22

## 2023-10-22 RX ORDER — FENTANYL CITRATE 50 UG/ML
INJECTION, SOLUTION INTRAMUSCULAR; INTRAVENOUS PRN
Status: DISCONTINUED | OUTPATIENT
Start: 2023-10-22 | End: 2023-10-22

## 2023-10-22 RX ADMIN — PHENYLEPHRINE HYDROCHLORIDE 100 MCG: 10 INJECTION INTRAVENOUS at 12:13

## 2023-10-22 RX ADMIN — KETOROLAC TROMETHAMINE 15 MG: 30 INJECTION, SOLUTION INTRAMUSCULAR at 13:12

## 2023-10-22 RX ADMIN — SODIUM CHLORIDE: 9 INJECTION, SOLUTION INTRAVENOUS at 04:15

## 2023-10-22 RX ADMIN — CYCLOBENZAPRINE 10 MG: 10 TABLET, FILM COATED ORAL at 20:47

## 2023-10-22 RX ADMIN — PHENYLEPHRINE HYDROCHLORIDE 100 MCG: 10 INJECTION INTRAVENOUS at 12:18

## 2023-10-22 RX ADMIN — SODIUM CHLORIDE, POTASSIUM CHLORIDE, SODIUM LACTATE AND CALCIUM CHLORIDE: 600; 310; 30; 20 INJECTION, SOLUTION INTRAVENOUS at 14:37

## 2023-10-22 RX ADMIN — METOPROLOL SUCCINATE 50 MG: 50 TABLET, EXTENDED RELEASE ORAL at 20:47

## 2023-10-22 RX ADMIN — PHENYLEPHRINE HYDROCHLORIDE 100 MCG: 10 INJECTION INTRAVENOUS at 12:44

## 2023-10-22 RX ADMIN — DULOXETINE 60 MG: 60 CAPSULE, DELAYED RELEASE ORAL at 20:47

## 2023-10-22 RX ADMIN — MIDAZOLAM 1 MG: 1 INJECTION INTRAMUSCULAR; INTRAVENOUS at 12:01

## 2023-10-22 RX ADMIN — HYDROMORPHONE HYDROCHLORIDE 0.4 MG: 0.2 INJECTION, SOLUTION INTRAMUSCULAR; INTRAVENOUS; SUBCUTANEOUS at 10:41

## 2023-10-22 RX ADMIN — LIDOCAINE HYDROCHLORIDE 40 MG: 10 INJECTION, SOLUTION INFILTRATION; PERINEURAL at 12:06

## 2023-10-22 RX ADMIN — SODIUM CHLORIDE, POTASSIUM CHLORIDE, SODIUM LACTATE AND CALCIUM CHLORIDE: 600; 310; 30; 20 INJECTION, SOLUTION INTRAVENOUS at 12:01

## 2023-10-22 RX ADMIN — HYDROMORPHONE HYDROCHLORIDE 0.4 MG: 0.2 INJECTION, SOLUTION INTRAMUSCULAR; INTRAVENOUS; SUBCUTANEOUS at 13:59

## 2023-10-22 RX ADMIN — PHENYLEPHRINE HYDROCHLORIDE 100 MCG: 10 INJECTION INTRAVENOUS at 12:54

## 2023-10-22 RX ADMIN — ACETAMINOPHEN 650 MG: 325 TABLET ORAL at 07:54

## 2023-10-22 RX ADMIN — SODIUM CHLORIDE, POTASSIUM CHLORIDE, SODIUM LACTATE AND CALCIUM CHLORIDE: 600; 310; 30; 20 INJECTION, SOLUTION INTRAVENOUS at 11:56

## 2023-10-22 RX ADMIN — FENTANYL CITRATE 50 MCG: 50 INJECTION, SOLUTION INTRAMUSCULAR; INTRAVENOUS at 13:46

## 2023-10-22 RX ADMIN — HYDROMORPHONE HYDROCHLORIDE 0.5 MG: 1 INJECTION, SOLUTION INTRAMUSCULAR; INTRAVENOUS; SUBCUTANEOUS at 12:28

## 2023-10-22 RX ADMIN — FENTANYL CITRATE 50 MCG: 50 INJECTION, SOLUTION INTRAMUSCULAR; INTRAVENOUS at 13:51

## 2023-10-22 RX ADMIN — TAMSULOSIN HYDROCHLORIDE 0.4 MG: 0.4 CAPSULE ORAL at 15:15

## 2023-10-22 RX ADMIN — ROCURONIUM BROMIDE 40 MG: 10 INJECTION, SOLUTION INTRAVENOUS at 12:06

## 2023-10-22 RX ADMIN — HYDROMORPHONE HYDROCHLORIDE 0.2 MG: 0.2 INJECTION, SOLUTION INTRAMUSCULAR; INTRAVENOUS; SUBCUTANEOUS at 17:50

## 2023-10-22 RX ADMIN — SUGAMMADEX 140 MG: 100 INJECTION, SOLUTION INTRAVENOUS at 13:11

## 2023-10-22 RX ADMIN — HYDROMORPHONE HYDROCHLORIDE 0.4 MG: 0.2 INJECTION, SOLUTION INTRAMUSCULAR; INTRAVENOUS; SUBCUTANEOUS at 07:54

## 2023-10-22 RX ADMIN — ROCURONIUM BROMIDE 10 MG: 10 INJECTION, SOLUTION INTRAVENOUS at 12:15

## 2023-10-22 RX ADMIN — HYDROMORPHONE HYDROCHLORIDE 0.5 MG: 1 INJECTION, SOLUTION INTRAMUSCULAR; INTRAVENOUS; SUBCUTANEOUS at 13:02

## 2023-10-22 RX ADMIN — HYDROMORPHONE HYDROCHLORIDE 0.4 MG: 0.2 INJECTION, SOLUTION INTRAMUSCULAR; INTRAVENOUS; SUBCUTANEOUS at 14:06

## 2023-10-22 RX ADMIN — OXYCODONE HYDROCHLORIDE 5 MG: 5 TABLET ORAL at 20:17

## 2023-10-22 RX ADMIN — FENTANYL CITRATE 50 MCG: 50 INJECTION INTRAMUSCULAR; INTRAVENOUS at 12:06

## 2023-10-22 RX ADMIN — ONDANSETRON 4 MG: 2 INJECTION INTRAMUSCULAR; INTRAVENOUS at 13:07

## 2023-10-22 RX ADMIN — PROPOFOL 150 MG: 10 INJECTION, EMULSION INTRAVENOUS at 12:06

## 2023-10-22 RX ADMIN — GABAPENTIN 600 MG: 300 CAPSULE ORAL at 20:17

## 2023-10-22 RX ADMIN — Medication 2 G: at 12:11

## 2023-10-22 RX ADMIN — INDOCYANINE GREEN AND WATER 2.5 MG: KIT at 12:00

## 2023-10-22 RX ADMIN — DEXAMETHASONE SODIUM PHOSPHATE 4 MG: 4 INJECTION, SOLUTION INTRA-ARTICULAR; INTRALESIONAL; INTRAMUSCULAR; INTRAVENOUS; SOFT TISSUE at 12:06

## 2023-10-22 ASSESSMENT — ACTIVITIES OF DAILY LIVING (ADL)
ADLS_ACUITY_SCORE: 22
ADLS_ACUITY_SCORE: 24
ADLS_ACUITY_SCORE: 35
ADLS_ACUITY_SCORE: 22
ADLS_ACUITY_SCORE: 24
ADLS_ACUITY_SCORE: 24
ADLS_ACUITY_SCORE: 22
ADLS_ACUITY_SCORE: 35
ADLS_ACUITY_SCORE: 22
ADLS_ACUITY_SCORE: 35
ADLS_ACUITY_SCORE: 24
ADLS_ACUITY_SCORE: 35

## 2023-10-22 ASSESSMENT — ENCOUNTER SYMPTOMS: DYSRHYTHMIAS: 1

## 2023-10-22 NOTE — PROGRESS NOTES
PRIMARY DIAGNOSIS: BILIARY COLIC/UNCOMPLICATED EARLY ACUTE CHOLECYSTITIS  OUTPATIENT/OBSERVATION GOALS TO BE MET BEFORE DISCHARGE:    1. Pain status: Pain is tolerable without PRN pain medications.  2. Stable vital signs and labs (if performed) at disposition: Yes  3. Tolerating adequate PO diet: No NPO for surgery at 1030  4. Successful cholecystectomy or clear follow up plan with General Surgery team if immediate surgery not performed Yes, surgery at 1030  5. ADLs back to baseline?  Yes  6. Activity and level of assistance: Ambulating independently.  7. Barriers to discharge noted Yes. Surgery at 1030    Discharge Planner Nurse   Safe discharge environment identified: Yes  Barriers to discharge: Yes       Entered by: RUSLAN SHAH RN 10/22/2023 7:41 AM     Please review provider order for any additional goals.   Nurse to notify provider when observation goals have been met and patient is ready for discharge.

## 2023-10-22 NOTE — DISCHARGE INSTRUCTIONS
Follow up: please call us at [phone] to schedule an appointment at your convenience.       Diet: Regular diet. Patients can have difficulty with constipation following surgery, due in part to the administration of narcotic medications.  If you are suffering with constipation, you should avoid foods such as hard cheeses or red meat.  Foods high infiber are recommended.       Activity: You should continue to be active at home, including ambulating frequently.  If possible try to limit the amount of time spent in bed.     Restrictions: You have no liftingrestrictions post operatively, but may wish to avoid strenuous physical activity for 1-2 weeks.  You should limit your physical activity if it causes you discomfort; however, this should resolve within 1-2 weeks.   Walking does not count as strenuous physical activity.  You are safe to walk up and down stairs.  Following 2 weeks you may resume all normal physical activity.     Wound / drain care: Your incisions are closed using absorbale sutures.  The skin is sealed with a surgical glue.  Do not peal the glue off.  Please allow the glue to peal off on its own.      It is normal to have a smallrim of red present around the incisions. This should not, however, extend beyond 1/4 inch from the incision.  If your incisions become increasingly tender, red, or draining, please contact us.        Bathing: Youmay shower after 24 hours from surgery.  It is ok to get your incisions wet, but avoid rubbing them.  Avoid soaking in bath tubs, or swimming in lakes, pools, or streams for 4 weeks following surgery.

## 2023-10-22 NOTE — PLAN OF CARE
Problem: Pain Acute  Goal: Optimal Pain Control and Function  10/22/2023 1203 by Sepideh Acuña RN  Outcome: Progressing  10/22/2023 0734 by Sepideh Acuña RN  Outcome: Progressing  Intervention: Develop Pain Management Plan  Recent Flowsheet Documentation  Taken 10/22/2023 1102 by Sepideh Acuña RN  Pain Management Interventions: medication offered but refused  Taken 10/22/2023 1041 by Sepideh Acuña RN  Pain Management Interventions: medication (see MAR)  Taken 10/22/2023 0834 by Sepideh Acuña RN  Pain Management Interventions: rest  Taken 10/22/2023 0809 by Sepideh Acuña RN  Pain Management Interventions: distraction  Taken 10/22/2023 0754 by Sepideh Acuña RN  Pain Management Interventions: medication (see MAR)  Taken 10/22/2023 0725 by Sepideh Acuña RN  Pain Management Interventions: rest  Intervention: Prevent or Manage Pain  Recent Flowsheet Documentation  Taken 10/22/2023 1125 by Sepideh Acuña RN  Medication Review/Management: medications reviewed  Taken 10/22/2023 0930 by Sepideh Acuña RN  Medication Review/Management: medications reviewed  Taken 10/22/2023 0725 by Sepideh Acuña RN  Medication Review/Management: medications reviewed     PRIMARY DIAGNOSIS: ACUTE RENAL COLIC    OUTPATIENT/OBSERVATION GOALS TO BE MET BEFORE DISCHARGE  1. Pain Status: Improved but still requiring IV narcotics.    2. Tolerating adequate PO diet:  NPO in anticipation for scheduled Cholecystectomy    3. Surgical Intervention planned: Yes    4. Cleared by consultants (if involved): No, pending scheduled Cholecystectomy    5. Return to near baseline physical activity: Yes    Discharge Planner Nurse   Safe discharge environment identified:  Yes  Barriers to discharge:  Pending scheduled Cholecystectomy       Entered by: Sepideh Acuña RN 10/22/2023 12:03 PM     Please review provider order for any additional goals.   Nurse to notify provider when observation goals have been met and  patient is ready for discharge.    Pt is A & O x 4 & endorses moderate RUQ pain; Utilizing PRN IV Dilaudid w/ effective results. Saline locked. CMS intact x 4. Voiding spontaneously; Straining urine w/ no stones noted at this time. BS present but faint. LS clear. VSS. NPO. Up ad tate. Reviewed pre-op checklist w/ pt, gave report to ABY & performed CHG bath, nasal swabs, linen & gown change.     Currently awaiting pt's return from surgery.     ADELINA MartinN  Shift: 0700 - [phone]

## 2023-10-22 NOTE — PROGRESS NOTES
General Surgery Progress Note:    Hospital Day # 0    ASSESSMENT:   1. Acute cholecystitis    2. Symptomatic cholelithiasis    3. Ureterolithiasis        Anastacio Powers is a 57 year old female presenting with abdominal pain likely secondary to acute cholecystitis versus symptomatic cholelithiasis    PLAN:   To the OR today for robotic cholecystectomy  Keep patient n.p.o. and continue with IV fluid resuscitation      SUBJECTIVE:   Anastacio Powers was seen on rounds.     General: No acute distress, calm and cooperative, alert and oriented  Cardiac: Regular rate and rhythm  Respiratory: Breath sounds bilaterally, no wheezing  Abdominal: Soft, tender to palpation in the right upper quadrant, nondistended  Musculoskeletal: All extremities no gross deformity.  Moving all extremities and good range of motion.  Skin: No skin lesions or masses.       DO Suleman Rojas DO  General Surgeon  [facility]  Surgery [facility] - [address] [address]?  Office: [phone]  Employed by - [employer]  Pager: [phone]

## 2023-10-22 NOTE — PLAN OF CARE
Received patient from ED. Patient settled in bed. Oriented to room. Patient is steady and independent. Denies n/v. Denies pain. Patient is NPO.

## 2023-10-22 NOTE — ANESTHESIA PREPROCEDURE EVALUATION
Anesthesia Pre-Procedure Evaluation    Patient: Anastacio Powers   MRN: [id number] : 1965        Procedure : Procedure(s):  CHOLECYSTECTOMY, ROBOT-ASSISTED, LAPAROSCOPIC, USING DA SARA XI          History reviewed. No pertinent past medical history.   History reviewed. No pertinent surgical history.   No Known Allergies   Social History     Tobacco Use     Smoking status: Not on file     Smokeless tobacco: Not on file   Substance Use Topics     Alcohol use: Not on file      Wt Readings from Last 1 Encounters:   10/21/23 70.3 kg (155 lb)        Anesthesia Evaluation            ROS/MED HX  ENT/Pulmonary:  - neg pulmonary ROS     Neurologic:  - neg neurologic ROS     Cardiovascular:  - neg cardiovascular ROS   (+)  - -   -  - -                        dysrhythmias, PVCs,             METS/Exercise Tolerance:     Hematologic:  - neg hematologic  ROS     Musculoskeletal:  - neg musculoskeletal ROS     GI/Hepatic:     (+)          cholecystitis/cholelithiasis,          Renal/Genitourinary:  - neg Renal ROS     Endo:  - neg endo ROS     Psychiatric/Substance Use:  - neg psychiatric ROS     Infectious Disease:  - neg infectious disease ROS     Malignancy:       Other:            Physical Exam    Airway        Mallampati: II   TM distance: > 3 FB   Neck ROM: full     Respiratory Devices and Support         Dental       (+) Edentulous      Cardiovascular             Pulmonary               OUTSIDE LABS:  CBC:   Lab Results   Component Value Date    WBC 5.9 10/21/2023    WBC 5.4 10/29/2019    HGB 11.9 10/21/2023    HGB 13.9 10/29/2019    HCT 38.3 10/21/2023    HCT 42.3 10/29/2019     10/21/2023     10/29/2019     BMP:   Lab Results   Component Value Date     10/21/2023     10/29/2019    POTASSIUM 3.8 10/21/2023    POTASSIUM 4.1 10/29/2019    CHLORIDE 104 10/21/2023    CHLORIDE 104 10/29/2019    CO2 26 10/21/2023    CO2 29 10/29/2019    BUN 16.3 10/21/2023    BUN 24 (H) 10/29/2019    CR 0.81  "10/21/2023    CR 0.78 10/29/2019    GLC 77 10/21/2023    GLC 80 10/29/2019     COAGS: No results found for: \"PTT\", \"INR\", \"FIBR\"  POC: No results found for: \"BGM\", \"HCG\", \"HCGS\"  HEPATIC:   Lab Results   Component Value Date    ALBUMIN 4.4 10/21/2023    PROTTOTAL 7.3 10/21/2023    ALT 22 10/21/2023    AST 21 10/21/2023    ALKPHOS 94 10/21/2023    BILITOTAL 0.2 10/21/2023     OTHER:   Lab Results   Component Value Date    DILSHAD 9.5 10/21/2023    LIPASE 26 10/21/2023    CRP 0.3 10/06/2019       Anesthesia Plan    ASA Status:  2       Anesthesia Type: General.              Consents    Anesthesia Plan(s) and associated risks, benefits, and realistic alternatives discussed. Questions answered and patient/representative(s) expressed understanding.     - Discussed: Risks, Benefits and Alternatives for BOTH SEDATION and the PROCEDURE were discussed     - Discussed with:  Patient      - Extended Intubation/Ventilatory Support Discussed: No.      - Patient is DNR/DNI Status: No     Use of blood products discussed: No .     Postoperative Care    Pain management: IV analgesics.   PONV prophylaxis: Ondansetron (or other 5HT-3), Dexamethasone or Solumedrol     Comments:                Oral Sheridan, DO  "

## 2023-10-22 NOTE — ANESTHESIA PROCEDURE NOTES
Airway         Procedure Start/Stop Times: 10/22/2023 12:09 PM  Staff -        Anesthesiologist:  Oral Sheridan DO       CRNA: Urban Hartley APRN CRNA       Performed By: CRNAIndications and Patient Condition       Indications for airway management: airway protection       Induction type:intravenous       Mask difficulty assessment: 2 - vent by mask + OA or adjuvant +/- NMBA    Final Airway Details       Final airway type: endotracheal airway       Successful airway: ETT - single  Endotracheal Airway Details        ETT size (mm): 7.0       Cuffed: yes       Successful intubation technique: direct laryngoscopy       DL Blade Type: Cates 2       Grade View of Cords: 1       Position: Right       Measured from: gums/teeth       Secured at (cm): 20       Bite block used: None    Post intubation assessment        Placement verified by: capnometry, equal breath sounds and chest rise        Number of attempts at approach: 1       Number of other approaches attempted: 0       Secured with: silk tape       Ease of procedure: easy       Dentition: Intact    Medication(s) Administered   Medication Administration Time: 10/22/2023 12:09 PM

## 2023-10-22 NOTE — CONSULTS
[facility] CONSULTATION    Type of Consult: observation  Place of Service: [facility]   Reason for consult: Right UVJ stone  Request for consult by: Dr. Contreras    History of present illness:   Anastacio Powers is a 57 year old female that was admitted for biliary colic. Urology is being consulted for a 3 mm obstructing right UVJ stone. History obtained through patient and chart review.     Patient was admitted yesterday for management of biliary colic.  She had presented with right upper quadrant pain, had a positive Abdul sign in the ED.  General surgery was consulted and plans for laparoscopic cholecystectomy 10/22 at 1030a.  Pain seemed most consistent with biliary pathology.  There was an incidental 3 mm obstructing stone at the right UVJ on CT 10/21/2023.  Vitals have been normal, patient afebrile.  Labs unremarkable with WBC 5.9, creatinine 0.81, UA with 93 RBCs, 25 leuks, no nitrites, no WBCs or bacteria.  Urology consulted for stone management recommendations.    Patient endorses right upper abdominal pain this morning. Nausea improved. Afebrile. She does not have a history of kidney stones. Her mother does have a history of kidney stones. No prior urologic procedures.  She is not established with a urologist.    Past Medical History:  History reviewed. No pertinent past medical history.    Past Surgical History:  History reviewed. No pertinent surgical history.    Social History:  Social History     Socioeconomic History    Marital status: Single     Spouse name: Not on file    Number of children: Not on file    Years of education: Not on file    Highest education level: Not on file   Occupational History    Not on file   Tobacco Use    Smoking status: Not on file    Smokeless tobacco: Not on file   Substance and Sexual Activity    Alcohol use: Not on file    Drug use: Not on file    Sexual activity: Not on file   Other Topics Concern    Not on file   Social History Narrative    Not on  file     Social Determinants of Health     Financial Resource Strain: Not on file   Food Insecurity: Not on file   Transportation Needs: Not on file   Physical Activity: Not on file   Stress: Not on file   Social Connections: Not on file   Interpersonal Safety: Not on file   Housing Stability: Not on file       Medications:  Current Facility-Administered Medications   Medication    acetaminophen (TYLENOL) tablet 650 mg    Or    acetaminophen (TYLENOL) Suppository 650 mg    ALPRAZolam (XANAX) half-tab 0.125-0.25 mg    cefTRIAXone (ROCEPHIN) 1 g vial to attach to  mL bag for ADULTS or NS 50 mL bag for PEDS    cyclobenzaprine (FLEXERIL) tablet 10 mg    DULoxetine (CYMBALTA) DR capsule 60 mg    gabapentin (NEURONTIN) capsule 600 mg    HYDROmorphone (DILAUDID) injection 0.2 mg    HYDROmorphone (DILAUDID) injection 0.4 mg    ketorolac (TORADOL) injection 15 mg    melatonin tablet 1 mg    metoprolol succinate ER (TOPROL XL) 24 hr tablet 50 mg    ondansetron (ZOFRAN ODT) ODT tab 4 mg    Or    ondansetron (ZOFRAN) injection 4 mg    polyethylene glycol (MIRALAX) Packet 17 g    prochlorperazine (COMPAZINE) injection 10 mg    Or    prochlorperazine (COMPAZINE) tablet 10 mg    Or    prochlorperazine (COMPAZINE) suppository 25 mg    sodium chloride 0.9 % infusion    tamsulosin (FLOMAX) capsule 0.4 mg       Allergies:   No Known Allergies    Review of Systems:   A full 12 point review of systems was taken and is negative aside from what is noted above in the HPI    Physical Exam:  Temp:  [97.5  F (36.4  C)-98.6  F (37  C)] 97.6  F (36.4  C)  Pulse:  [73-94] 73  Resp:  [16-18] 16  BP: (101-136)/(58-84) 107/63  SpO2:  [95 %-100 %] 96 %  General: NAD, alert, cooperative  Head: normocephalic, without abnormality / atraumatic  Abdomen: soft, right upper abdomen tender, non distended. No suprapubic fullness/tenderness. No CVA tenderness noted  Skin: no rashes or lesions  Musculoskeletal: moves all extremities equally; no calf  edema or tenderness  Psychological: alert and oriented, answers questions appropriately      Labs:   Creatinine   Date Value Ref Range Status   10/21/2023 0.81 0.51 - 0.95 mg/dL Final       Lab Results   Component Value Date    WBC 5.9 10/21/2023     Lab Results   Component Value Date    HGB 11.9 10/21/2023     Lab Results   Component Value Date     10/21/2023       UA RESULTS:  Recent Labs   Lab Test 10/21/23  1557 10/29/19  1938   COLOR Yellow Yellow   APPEARANCE Turbid* Clear   URINEGLC Negative Negative   URINEBILI Negative Negative   URINEKETONE Negative Negative   SG 1.026 1.020   UBLD 0.2 mg/dL* Negative   URINEPH 5.0 6.0   PROTEIN 20* Negative   UROBILINOGEN  --  <2.0 E.U./dL   NITRITE Negative Negative   LEUKEST 25 Dee/uL* Large*   RBCU 93* 0-2   WBCU 0 0-5       Lab Results: personally reviewed     Imaging:  EXAM: CT ABDOMEN PELVIS W/O CONTRAST  LOCATION: [facility]  DATE: 10/21/2023     INDICATION: R flank pain  COMPARISON: CT 10/29/2019  TECHNIQUE: CT scan of the abdomen and pelvis was performed without IV contrast. Multiplanar reformats were obtained. Dose reduction techniques were used.  CONTRAST: None.     FINDINGS:   LOWER CHEST: Unremarkable.     HEPATOBILIARY: Stable simple-appearing left hepatic cyst, no specific follow-up recommended. Cholelithiasis without evidence of acute cholecystitis or biliary obstruction.     PANCREAS: Normal.     SPLEEN: Normal.     ADRENAL GLANDS: Normal.     KIDNEYS/BLADDER: 3 mm obstructing stone at the right ureterovesicular junction with mild proximal hydroureteronephrosis. No nonobstructing renal stones bilaterally. Urinary bladder is decompressed but otherwise unremarkable.     BOWEL: No obstruction or inflammatory change. Normal appendix. Moderate volume formed stool in the colon.     LYMPH NODES: No suspicious lymphadenopathy.     VASCULATURE: Minimal calcified atherosclerosis.     PELVIC ORGANS: Hysterectomy.     MUSCULOSKELETAL:  No acute bony abnormality. Prior fusion at L5/S1.                                                                         IMPRESSION:   1.  Obstructing 3 mm stone at the right ureterovesicular junction with mild proximal hydroureteronephrosis.  2.  Cholelithiasis without evidence of acute cholecystitis.    I have personally reviewed the imaging reports above.     Assessment / Plan : Anastacio Powers is being seen by [facility] for an obstructing 3 mm right UVJ stone.     - 57-year-old female admitted with biliary colic, going for a laparoscopic cholecystectomy this morning, was found to have a 3 mm right UVJ stone.   - No clear flank pain. UA noninfectious. WBC and creatinine normal. No indication for ureteral stent/stone procedure this admission.   - Recommend trial of passage with MET. Please discharge patient with Flomax 0.4mg daily, pain medication prn, and strain all urine in case of passage.   - Our office will contact patient this week to schedule follow up appointment in 2-4 weeks.  - Patient will call our office if she develops worsening flank pain, N/V, fever, or has any other questions/concerns.   - Urology will sign off at this time.    Thank you for consulting [facility] regarding this patient's care. Please contact us with questions or concerns.     Hannah Church PA-C  [facility]   [phone]

## 2023-10-22 NOTE — PROGRESS NOTES
PRIMARY DIAGNOSIS: BILIARY COLIC/UNCOMPLICATED EARLY ACUTE CHOLECYSTITIS  OUTPATIENT/OBSERVATION GOALS TO BE MET BEFORE DISCHARGE:    1. Pain status: Improved but still requiring IV narcotics.  2. Stable vital signs and labs (if performed) at disposition: Yes  3. Tolerating adequate PO diet: No, NPO for surgery  4. Successful cholecystectomy or clear follow up plan with General Surgery team if immediate surgery not performed Yes surgery today at 1030  5. ADLs back to baseline?  Yes, independent    6. Activity and level of assistance: Ambulating independently.  7. Barriers to discharge noted Yes surgery today    Discharge Planner Nurse   Safe discharge environment identified: Yes  Barriers to discharge: Yes       Entered by: RUSLAN SHAH RN 10/22/2023 3:09 AM     Please review provider order for any additional goals.   Nurse to notify provider when observation goals have been met and patient is ready for discharge.

## 2023-10-22 NOTE — PLAN OF CARE
Patient alert and oriented. VSS. RA. NS infusing at 100ml/hr. Steady and independent in room. Able to make needs known. On NPO. Will have surgery 10/22 at 10:30 am. Patient complained of pain, PRN Dilaudid and Toradol given. Call light within reach. Plan of care ongoing.

## 2023-10-22 NOTE — PROGRESS NOTES
[facility]    Medicine Progress Note - Hospitalist Service    Date of Admission:  10/21/2023    Assessment & Plan    57 year old female with history of anxiety and depression, symptomatic PVCs, previous abdominal adhesions status post lysis of adhesion, admitted on 10/21/2023 for right upper quadrant abdominal pain.  CT imaging reveals right UVJ stone with mild proximal hydroureteronephrosis.  Plan for robot assisted lap cholecystectomy for biliary colic and cholelithiasis on 10/22/23.  Urology recommending conservative management of right UVJ stone.      Right abdominal pain, cholelithiasis. Possible biliary colic.   Urology recommending conservative management of stone.   Flomax 0.4 mg daily and pain control.  Outpatient follow up.   Right ureteral stone hydroureteronephrosis.  Microscopic hematuria  Continue ketorolac 15 mg IV every 6 hours as needed  Dilaudid 0.2 to 0.4 mg IV every 2 hours as needed.  N.p.o. status  Surgery today.     History of symptomatic PVCs  PTA medication: Metoprolol 50 mg at bedtime     History abdominal adhesions status post multiple lysis of adhesions in the past.     Anxiety and depression  PTA medication: Alprazolam 0.125 to 0.25 mg at bedtime as needed  Duloxetine 60 mg at bedtime  Gabapentin 600 mg twice daily     Overweight Body mass index is 27.46 kg/m .  Hold PTA medication: Trulicity and phentermine.          Observation Goals: -diagnostic tests and consults completed and resulted, -vital signs normal or at patient baseline, -tolerating oral intake to maintain hydration, -adequate pain control on oral analgesics, -returns to baseline functional status, -safe disposition plan has been identified, -Urology recommendations., Nurse to notify provider when observation goals have been met and patient is ready for discharge.  Diet: NPO for Medical/Clinical Reasons Except for: Meds, Ice Chips    DVT Prophylaxis: Pneumatic Compression Devices  Mata Catheter:  "Not present  Lines: None     Cardiac Monitoring: None  Code Status: Full Code      Clinically Significant Risk Factors Present on Admission                       # Overweight: Estimated body mass index is 27.46 kg/m  as calculated from the following:    Height as of this encounter: 1.6 m (5' 2.99\").    Weight as of this encounter: 70.3 kg (155 lb).              Disposition Plan      Expected Discharge Date: 10/22/2023                    Mark Contreras DO  Hospitalist Service  [facility]  Securely message with [employer] (more info)  Text page via [employer] Paging/Directory   ______________________________________________________________________    Interval History   No new complaints.  Pain is controlled.  She denies flank pain, or hematuria.  Awaiting surgery today.     Physical Exam   Vital Signs: Temp: 97.6  F (36.4  C) Temp src: Oral BP: 107/63 Pulse: 73   Resp: 16 SpO2: 96 % O2 Device: None (Room air)    Weight: 155 lbs 0 oz    General Appearance: Well nourished female.   Eyes: PERRL, EOMIx2.  HEENT: NC, AT, neck supple, trachea midline.   Back: No flank tenderness.  Respiratory: CTAB no wheezes rales or rhonchi.  Cardiovascular: RRR, no murmur bruit rub or gallop.  GI: Right sided abdominal pain to palpation.  Negative Abdul sign.   Skin: Warm dry, no exanthem.  Musculoskeletal: Moving all extremities spontaneously and ppurposefully.   Neurologic: Aox3, general sensation intact all extremities.   Psychiatric: Appropriate mood and affect.    Medical Decision Making       40 MINUTES SPENT BY ME on the date of service doing chart review, history, exam, documentation & further activities per the note.      Data     I have personally reviewed the following data over the past 24 hrs:    4.0  \   10.6 (L)   / 273     139 108 (H) 18.0 /  92   4.4 25 0.78 \     ALT: 22 AST: 21 AP: 94 TBILI: 0.2   ALB: 4.4 TOT PROTEIN: 7.3 LIPASE: 26       Imaging results reviewed over the past 24 hrs:   Recent " Results (from the past 24 hour(s))   Abdomen US, limited (RUQ only)    Narrative    EXAM: US ABDOMEN LIMITED  LOCATION: [facility]  DATE: 10/21/2023    INDICATION: RUQ pain after eating  COMPARISON: 10/29/2019  TECHNIQUE: Limited abdominal ultrasound.    FINDINGS:    GALLBLADDER: Multiple small stones. Otherwise normal. No wall thickening or pericholecystic free fluid. No focal tenderness, by sonographer report.    BILE DUCTS: No biliary dilatation. The common duct measures 6 mm.    LIVER: Mild diffuse fatty infiltration. 9 mm benign simple cyst.    RIGHT KIDNEY: No hydronephrosis.    PANCREAS: Normal where seen, though mostly obscured by bowel gas.    No ascites.      Impression    IMPRESSION:  Cholelithiasis without evidence of cholecystitis       CT Abdomen Pelvis w/o Contrast    Narrative    EXAM: CT ABDOMEN PELVIS W/O CONTRAST  LOCATION: [facility]  DATE: 10/21/2023    INDICATION: R flank pain  COMPARISON: CT 10/29/2019  TECHNIQUE: CT scan of the abdomen and pelvis was performed without IV contrast. Multiplanar reformats were obtained. Dose reduction techniques were used.  CONTRAST: None.    FINDINGS:   LOWER CHEST: Unremarkable.    HEPATOBILIARY: Stable simple-appearing left hepatic cyst, no specific follow-up recommended. Cholelithiasis without evidence of acute cholecystitis or biliary obstruction.    PANCREAS: Normal.    SPLEEN: Normal.    ADRENAL GLANDS: Normal.    KIDNEYS/BLADDER: 3 mm obstructing stone at the right ureterovesicular junction with mild proximal hydroureteronephrosis. No nonobstructing renal stones bilaterally. Urinary bladder is decompressed but otherwise unremarkable.    BOWEL: No obstruction or inflammatory change. Normal appendix. Moderate volume formed stool in the colon.    LYMPH NODES: No suspicious lymphadenopathy.    VASCULATURE: Minimal calcified atherosclerosis.    PELVIC ORGANS: Hysterectomy.    MUSCULOSKELETAL: No acute bony  abnormality. Prior fusion at L5/S1.        Impression    IMPRESSION:   1.  Obstructing 3 mm stone at the right ureterovesicular junction with mild proximal hydroureteronephrosis.  2.  Cholelithiasis without evidence of acute cholecystitis.

## 2023-10-22 NOTE — PROGRESS NOTES
Po oxycodone prn added for po pain control option to achieve better pain control.  Micyk Miller MD .......... October 22, 2023, 6:37 PM

## 2023-10-22 NOTE — ANESTHESIA POSTPROCEDURE EVALUATION
Patient: Anastacio Powers    Procedure: Procedure(s):  CHOLECYSTECTOMY, ROBOT-ASSISTED, LAPAROSCOPIC, USING DA SARA XI       Anesthesia Type:  General    Note:  Disposition: Outpatient   Postop Pain Control: Uneventful            Sign Out: Well controlled pain   PONV: No   Neuro/Psych: Uneventful            Sign Out: Acceptable/Baseline neuro status   Airway/Respiratory: Uneventful            Sign Out: Acceptable/Baseline resp. status   CV/Hemodynamics: Uneventful            Sign Out: Acceptable CV status; No obvious hypovolemia; No obvious fluid overload   Other NRE: NONE   DID A NON-ROUTINE EVENT OCCUR? No    Event details/Postop Comments:  Patient recovering comfortably.        Last vitals:  Vitals Value Taken Time   /56 10/22/23 1420   Temp 36.3  C (97.4  F) 10/22/23 1420   Pulse 88 10/22/23 1420   Resp 14 10/22/23 1420   SpO2 99 % 10/22/23 1420       Electronically Signed By: Oral Sheridan DO  October 22, 2023  4:19 PM

## 2023-10-22 NOTE — PLAN OF CARE
Problem: Pain Acute  Goal: Optimal Pain Control and Function  10/22/2023 1544 by Sepideh Acuña RN  Outcome: Progressing  10/22/2023 1203 by Sepideh Acuña RN  Outcome: Progressing  10/22/2023 0734 by Sepideh Acuña RN  Outcome: Progressing  Intervention: Develop Pain Management Plan  Recent Flowsheet Documentation  Taken 10/22/2023 1511 by Sepideh Acuña RN  Pain Management Interventions: distraction  Taken 10/22/2023 1102 by Sepideh Acuña RN  Pain Management Interventions: medication offered but refused  Taken 10/22/2023 1041 by Sepideh Acuña RN  Pain Management Interventions: medication (see MAR)  Taken 10/22/2023 0834 by Sepideh Acuña RN  Pain Management Interventions: rest  Taken 10/22/2023 0809 by Sepideh Acuña RN  Pain Management Interventions: distraction  Taken 10/22/2023 0754 by Sepideh Acuña RN  Pain Management Interventions: medication (see MAR)  Taken 10/22/2023 0725 by Sepideh Acuña RN  Pain Management Interventions: rest  Intervention: Prevent or Manage Pain  Recent Flowsheet Documentation  Taken 10/22/2023 1530 by Sepideh Acuña RN  Medication Review/Management: medications reviewed  Taken 10/22/2023 1125 by Sepideh Acuña RN  Medication Review/Management: medications reviewed  Taken 10/22/2023 0930 by Sepideh Acuña RN  Medication Review/Management: medications reviewed  Taken 10/22/2023 0725 by Sepideh Acuña RN  Medication Review/Management: medications reviewed     Problem: Cholecystectomy  Goal: Absence of Bleeding  Outcome: Progressing  Goal: Fluid and Electrolyte Balance  Outcome: Progressing  Goal: Absence of Infection Signs and Symptoms  Outcome: Progressing  Goal: Anesthesia/Sedation Recovery  Outcome: Progressing  Intervention: Optimize Anesthesia Recovery  Recent Flowsheet Documentation  Taken 10/22/2023 1530 by Sepideh Acuña RN  Safety Promotion/Fall Prevention:   clutter free environment maintained   room near nurse's station    safety round/check completed  Goal: Pain Control and Function  Outcome: Progressing  Intervention: Prevent or Manage Pain  Recent Flowsheet Documentation  Taken 10/22/2023 1511 by Sepideh Acuña RN  Pain Management Interventions: distraction  Goal: Nausea and Vomiting Relief  Outcome: Progressing  Goal: Effective Urinary Elimination  Outcome: Progressing  Goal: Effective Oxygenation and Ventilation  Outcome: Progressing  Intervention: Optimize Oxygenation and Ventilation  Recent Flowsheet Documentation  Taken 10/22/2023 1530 by Sepideh Acuña RN  Head of Bed (HOB) Positioning: HOB at 20-30 degrees      PRIMARY DIAGNOSIS: BILIARY COLIC/UNCOMPLICATED EARLY ACUTE CHOLECYSTITIS  OUTPATIENT/OBSERVATION GOALS TO BE MET BEFORE DISCHARGE:    1. Pain status: Improved-controlled with oral pain medications.  2. Stable vital signs and labs (if performed) at disposition: Yes  3. Tolerating adequate PO diet: Yes  4. Successful cholecystectomy or clear follow up plan with General Surgery team if immediate surgery not performed Yes  5. ADLs back to baseline?  Yes  6. Activity and level of assistance: Up with standby assistance.  7. Barriers to discharge noted Yes, pt is due to void & due to ambulate    Discharge Planner Nurse   Safe discharge environment identified: Yes  Barriers to discharge: Yes, due to void & due to ambulate       Entered by: Sepideh Acuña RN 10/22/2023 3:45 PM     Please review provider order for any additional goals.   Nurse to notify provider when observation goals have been met and patient is ready for discharge.      Pt returned to  [place] from Cholecystectomy today at 1430. Pt is A & O x 4 & endorses mild pain to R. Side of the abdomen.  5 Derma-bond incisions are CDI & approximated. No analgesics given at this time. CMS intact x 4. Due to void & due to ambulate. BS hypoactive. LS are clear.  LR running at 100 ml. VSS. Advanced from clears to a regular diet. Anticipating discharge pending  ambulation, voiding & pain control.     1830:  Pt has now voided & ambulated. Gave PRN IV Dilaudid for pain control which was effective. Paged remote cross cover for an order for PO analgesics. Awaiting return call.    SHELLY Martin  Shift: [phone] - [phone]

## 2023-10-22 NOTE — PLAN OF CARE
Problem: Pain Acute  Goal: Optimal Pain Control and Function  Outcome: Progressing  Intervention: Develop Pain Management Plan  Recent Flowsheet Documentation  Taken 10/22/2023 0725 by Sepideh Acuña RN  Pain Management Interventions: rest  Intervention: Prevent or Manage Pain  Recent Flowsheet Documentation  Taken 10/22/2023 0725 by Sepideh Acuña RN  Medication Review/Management: medications reviewed     PRIMARY DIAGNOSIS: ACUTE RENAL COLIC    OUTPATIENT/OBSERVATION GOALS TO BE MET BEFORE DISCHARGE  1. Pain Status: Improved but still requiring IV narcotics.    2. Tolerating adequate PO diet:  NPO at this time in anticipation for Cholecystectomy    3. Surgical Intervention planned: Yes    4. Cleared by consultants (if involved): No, pending Cholecystectomy    5. Return to near baseline physical activity: Yes    Discharge Planner Nurse   Safe discharge environment identified: Yes  Barriers to discharge:  Pending Cholecystectomy       Entered by: Sepideh Acuña RN 10/22/2023 7:35 AM     Please review provider order for any additional goals.   Nurse to notify provider when observation goals have been met and patient is ready for discharge.    Pt is A & O x 4 & endorses mild to moderate RUQ pain as well as a headache; Utilizing PRN IV Dilaudid & PRN APAP w/ effective results. On Rocephin w/ NS running at 100 ml. CMS intact x 4. Voiding spontaneously; Straining urine w/ no stones noted at this time. BS present but faint. LS clear. VSS. NPO. Up ad tate. Anticipating cholecystectomy scheduled at 10:30 am.    SHELLY Martin  Shift: 0700 - 1930

## 2023-10-22 NOTE — ANESTHESIA CARE TRANSFER NOTE
Patient: Anastacio Powers    Procedure: Procedure(s):  CHOLECYSTECTOMY, ROBOT-ASSISTED, LAPAROSCOPIC, USING DA SARA XI       Diagnosis: Acute cholecystitis [K81.0]  Diagnosis Additional Information: No value filed.    Anesthesia Type:   General     Note:    Oropharynx: spontaneously breathing and oropharynx clear of all foreign objects  Level of Consciousness: awake  Oxygen Supplementation: face mask    Independent Airway: airway patency satisfactory and stable  Dentition: dentition unchanged  Vital Signs Stable: post-procedure vital signs reviewed and stable  Report to RN Given: handoff report given  Patient transferred to: PACU    Handoff Report: Identifed the Patient, Identified the Reponsible Provider, Reviewed the pertinent medical history, Discussed the surgical course, Reviewed Intra-OP anesthesia mangement and issues during anesthesia, Set expectations for post-procedure period and Allowed opportunity for questions and acknowledgement of understanding      Vitals:  Vitals Value Taken Time   /62 10/22/23 1332   Temp 36.6  C (97.9  F) 10/22/23 1330   Pulse 82 10/22/23 1334   Resp 21 10/22/23 1334   SpO2 99 % 10/22/23 1334   Vitals shown include unfiled device data.    Electronically Signed By: JAYSON Copeland CRNA  October 22, 2023  1:35 PM

## 2023-10-22 NOTE — OP NOTE
[facility]    Operative Note    Pre-operative diagnosis: Acute cholecystitis [K81.0]  Post-operative diagnosis Same as pre-operative diagnosis    Procedure: CHOLECYSTECTOMY, ROBOT-ASSISTED, LAPAROSCOPIC, USING DA SARA XI, N/A - Abdomen    Surgeon: Surgeon(s) and Role:     * Suleman Hyatt DO - Primary  Anesthesia: General   Estimated Blood Loss: 10 mL    Drains: None  Specimens:   ID Type Source Tests Collected by Time Destination   1 : GALLBLADDER Tissue Gallbladder SURGICAL PATHOLOGY EXAM Suleman Hyatt DO 10/22/2023 12:27 PM      Findings:     -Acute on chronic cholecystitis.  -Intraoperative cholangiogram using ICG green (cpt 75587)  Complications: None.  Implants: * No implants in log *      Indication: 57-year-old female presenting with symptomatic cholelithiasis versus acute cholecystitis.  After evaluation offered the patient procedure of robotic cholecystectomy.  The risks and benefits of the procedure were explained detail to the patient. The risks include infection, bleeding, damage to the surrounding structures. Patient verbalized understanding provided consent to undergo the procedure above.       Procedure: Patient was brought back to the operating room and was placed on the operating table in the supine position.  The patient's extremities were padded and positioned in the usual fashion.  The patient then underwent anesthesia sedation and intubation.  The patient's abdomen was prepped and draped in the usual sterile fashion.  Prior to initiating the procedure, a timeout was completed.  All present were in agreement.    1% lidocaine with epinephrine was instilled at Scott's point in the left upper quadrant.  A 15 blade was used to make a skin knick incision at Scott's point.  A Veress needle was inserted into the abdomen and insufflation was initiated. An 8 mm trocar was inserted at the infraumbilical port site.  Insufflation was then initiated.  Once insufflation was  completed, a general survey was completed.  I could identify that the patient had no injury of the abdominal contents upon entering the abdomen.  Acute on chronic cholecystitis.    An 8 mm port was placed to the right of the umbilicus.  Two 8 mm ports were placed in the left abdominal quadrant.  These ports were placed under direct visualization.  The robot was then docked and the robotic instruments were then inserted into the abdomen under direct visualization.      Patient had significant adhesions of omentum onto the anterior base of the gallbladder.  As result I did carefully dissect these omental adhesions away from the gallbladder using traction as well as the hook cautery.  The gallbladder was then grasped with tip up graspers and retracted cephalad.  The cystic duct and cystic artery were then carefully dissected and isolated with a combination of blunt dissection with the hook electrocautery.  Once the cystic artery and cystic duct were isolated the critical view was obtained.  Intraoperative cholangiogram with indocyanine green was completed.  Weck clips were used to clip across the cystic artery and duct.  The hook cautery was used to transect between the Weck clips of the cystic duct and cystic artery.  The gallbladder was removed off of the liver bed using electrocautery.  The gallbladder was then removed from the abdomen using an Endo Catch bag through the 8 mm left lower quadrant port.      Inspection of the liver bed revealed good hemostasis.  The fascia of the gallbladder extraction site was then closed using an 0 Vicryl suture with a suture passer.  A 3-0 Vicryl suture was used to perform deep dermal sutures at the 12 mm port site.  All surgical sites were then closed with a 4-0 Vicryl suture in a subcuticular fashion.  Surgical glue was used to reinforce all surgical skin incisions.  At the end of the procedure, a final count was completed.  I was told all sharps, sponges, instruments were  accounted for.  The patient tolerated the procedure with no complications.  The patient was then extubated and brought back to the PACU in stable condition.                Suleman Hyatt DO  General Surgeon  [facility]  Surgery [facility] - [address] [address]?  Office: [phone]  Employed by - [facility]  Pager: [phone]

## 2023-10-23 VITALS
DIASTOLIC BLOOD PRESSURE: 71 MMHG | WEIGHT: 155 LBS | RESPIRATION RATE: 16 BRPM | HEART RATE: 80 BPM | BODY MASS INDEX: 27.46 KG/M2 | OXYGEN SATURATION: 95 % | TEMPERATURE: 97.5 F | SYSTOLIC BLOOD PRESSURE: 124 MMHG | HEIGHT: 63 IN

## 2023-10-23 LAB
ANION GAP SERPL CALCULATED.3IONS-SCNC: 6 MMOL/L (ref 7–15)
BUN SERPL-MCNC: 18.5 MG/DL (ref 6–20)
CALCIUM SERPL-MCNC: 8.2 MG/DL (ref 8.6–10)
CHLORIDE SERPL-SCNC: 107 MMOL/L (ref 98–107)
CREAT SERPL-MCNC: 0.74 MG/DL (ref 0.51–0.95)
DEPRECATED HCO3 PLAS-SCNC: 27 MMOL/L (ref 22–29)
EGFRCR SERPLBLD CKD-EPI 2021: >90 ML/MIN/1.73M2
ERYTHROCYTE [DISTWIDTH] IN BLOOD BY AUTOMATED COUNT: 14 % (ref 10–15)
GLUCOSE SERPL-MCNC: 96 MG/DL (ref 70–99)
HCT VFR BLD AUTO: 32.8 % (ref 35–47)
HGB BLD-MCNC: 10 G/DL (ref 11.7–15.7)
MAGNESIUM SERPL-MCNC: 3 MG/DL (ref 1.7–2.3)
MCH RBC QN AUTO: 25.8 PG (ref 26.5–33)
MCHC RBC AUTO-ENTMCNC: 30.5 G/DL (ref 31.5–36.5)
MCV RBC AUTO: 85 FL (ref 78–100)
PHOSPHATE SERPL-MCNC: 3.8 MG/DL (ref 2.5–4.5)
PLATELET # BLD AUTO: 283 10E3/UL (ref 150–450)
POTASSIUM SERPL-SCNC: 4.1 MMOL/L (ref 3.4–5.3)
RBC # BLD AUTO: 3.88 10E6/UL (ref 3.8–5.2)
SODIUM SERPL-SCNC: 140 MMOL/L (ref 135–145)
WBC # BLD AUTO: 6.2 10E3/UL (ref 4–11)

## 2023-10-23 PROCEDURE — 99239 HOSP IP/OBS DSCHRG MGMT >30: CPT | Performed by: INTERNAL MEDICINE

## 2023-10-23 PROCEDURE — 99024 POSTOP FOLLOW-UP VISIT: CPT | Performed by: PHYSICIAN ASSISTANT

## 2023-10-23 PROCEDURE — 84100 ASSAY OF PHOSPHORUS: CPT | Performed by: SURGERY

## 2023-10-23 PROCEDURE — 250N000011 HC RX IP 250 OP 636: Mod: JZ | Performed by: SURGERY

## 2023-10-23 PROCEDURE — 99285 EMERGENCY DEPT VISIT HI MDM: CPT

## 2023-10-23 PROCEDURE — 250N000013 HC RX MED GY IP 250 OP 250 PS 637: Performed by: SURGERY

## 2023-10-23 PROCEDURE — 85027 COMPLETE CBC AUTOMATED: CPT | Performed by: SURGERY

## 2023-10-23 PROCEDURE — G0378 HOSPITAL OBSERVATION PER HR: HCPCS

## 2023-10-23 PROCEDURE — 80048 BASIC METABOLIC PNL TOTAL CA: CPT | Performed by: SURGERY

## 2023-10-23 PROCEDURE — 83735 ASSAY OF MAGNESIUM: CPT | Performed by: SURGERY

## 2023-10-23 PROCEDURE — 36415 COLL VENOUS BLD VENIPUNCTURE: CPT | Performed by: SURGERY

## 2023-10-23 PROCEDURE — 96372 THER/PROPH/DIAG INJ SC/IM: CPT | Performed by: SURGERY

## 2023-10-23 PROCEDURE — 250N000013 HC RX MED GY IP 250 OP 250 PS 637: Performed by: INTERNAL MEDICINE

## 2023-10-23 RX ORDER — TAMSULOSIN HYDROCHLORIDE 0.4 MG/1
0.4 CAPSULE ORAL DAILY
Qty: 30 CAPSULE | Refills: 0 | Status: SHIPPED | OUTPATIENT
Start: 2023-10-23

## 2023-10-23 RX ADMIN — ACETAMINOPHEN 650 MG: 325 TABLET ORAL at 08:06

## 2023-10-23 RX ADMIN — HEPARIN SODIUM 5000 UNITS: 5000 INJECTION, SOLUTION INTRAVENOUS; SUBCUTANEOUS at 07:57

## 2023-10-23 RX ADMIN — TAMSULOSIN HYDROCHLORIDE 0.4 MG: 0.4 CAPSULE ORAL at 07:57

## 2023-10-23 RX ADMIN — GABAPENTIN 600 MG: 300 CAPSULE ORAL at 07:57

## 2023-10-23 RX ADMIN — OXYCODONE HYDROCHLORIDE 5 MG: 5 TABLET ORAL at 05:25

## 2023-10-23 ASSESSMENT — ACTIVITIES OF DAILY LIVING (ADL)
ADLS_ACUITY_SCORE: 24

## 2023-10-23 NOTE — DISCHARGE SUMMARY
"[facility]  Hospitalist Discharge Summary      Date of Admission:  10/21/2023  Date of Discharge:  10/23/2023  Discharging Provider: Mark Contreras DO  Discharge Service: Hospitalist Service    Discharge Diagnoses   Acute on chronic cholecystitis with cholelithiasis  Hydronephrosis secondary to ureteral stone.   Microscopic hematuria  Abdominal adhesions    Clinically Significant Risk Factors     # Overweight: Estimated body mass index is 27.46 kg/m  as calculated from the following:    Height as of this encounter: 1.6 m (5' 2.99\").    Weight as of this encounter: 70.3 kg (155 lb).       Follow-ups Needed After Discharge   Follow-up Appointments     Follow-up and recommended labs and tests       Follow up with [facility] in 1 week for ureteral stone follow up.            Unresulted Labs Ordered in the Past 30 Days of this Admission       Date and Time Order Name Status Description    10/22/2023  1:17 PM Surgical Pathology Exam In process         These results will be followed up by [facility] surgery.    Discharge Disposition   Discharged to home  Condition at discharge: Stable    Hospital Course   57 year old female with history of anxiety and depression, symptomatic PVCs, previous abdominal adhesions status post lysis of adhesion, admitted on 10/21/2023 for right upper quadrant abdominal pain.  CT imaging reveals right UVJ stone with mild proximal hydroureteronephrosis.  Underwent uncomplicated robot assisted lap cholecystectomy for biliary colic and cholelithiasis with adhesiolysis on 10/22/23.  Urology recommending conservative management of right UVJ stone.  Patient had uncomplicated course after surgery and was tolerating postoperative pain and tolerating diet.  Outpatient follow up with Urology for stone management.     Consultations This Hospital Stay   UROLOGY IP CONSULT    Code Status   Full Code    Time Spent on this Encounter   I, Mark Contreras DO, " personally saw the patient today and spent greater than 30 minutes discharging this patient.       DO HANK [facility] [address]  [address] [place] [id number]  Phone: [phone]  Fax: [phone]  ______________________________________________________________________    Physical Exam   Vital Signs: Temp: 97.5  F (36.4  C) Temp src: Oral BP: 124/71 Pulse: 80   Resp: 16 SpO2: 95 % O2 Device: None (Room air) Oxygen Delivery: 3 LPM  Weight: 155 lbs 0 oz  General Appearance: Well nourished female.   Eyes: PERRL, EOMIx2.  HEENT: NC, AT, neck supple, trachea midline.   Back: No flank tenderness.  Respiratory: CTAB no wheezes rales or rhonchi.  Cardiovascular: RRR, no murmur bruit rub or gallop.  GI: minimal pain around abdominal incisions.   Skin: Warm dry, no exanthem.  Musculoskeletal: Moving all extremities spontaneously and ppurposefully.   Neurologic: Aox3, general sensation intact all extremities.   Psychiatric: Appropriate mood and affect.       Primary Care Physician   Evelyn Tello    Discharge Orders      Adult Urology  Referral      Reason for your hospital stay    Acute on chronic cholecystitis, status post robot assisted laparoscopic cholecystectomy, UVJ stone and mild hydronephrosis.     Follow-up and recommended labs and tests     Follow up with [facility] in 1 week for ureteral stone follow up.     Activity    Your activity upon discharge: activity as tolerated and no lifting, driving, or strenuous exercise for 2 weeks     When to contact your care team    Call your primary doctor if you have any of the following: temperature greater than 100.4 degrees, increased drainage, increased swelling, or increased pain.     Diet    Follow this diet upon discharge: Orders Placed This Encounter      Regular Diet Adult       Significant Results and Procedures   Most Recent 3 CBC's:  Recent Labs   Lab Test 10/23/23  9258 10/22/23  5814  10/22/23  0845 10/21/23  1318   WBC 6.2  --  4.0 5.9   HGB 10.0*  --  10.6* 11.9   MCV 85  --  85 84    285 273 326     Most Recent 3 BMP's:  Recent Labs   Lab Test 10/23/23  0438 10/22/23  0948 10/22/23  0845 10/21/23  1318     --  139 139   POTASSIUM 4.1  --  4.4 3.8   CHLORIDE 107  --  108* 104   CO2 27  --  25 26   BUN 18.5  --  18.0 16.3   CR 0.74  --  0.78 0.81   ANIONGAP 6*  --  6* 9   DILSHAD 8.2*  --  8.7 9.5   GLC 96 88 92 77     Most Recent 2 LFT's:  Recent Labs   Lab Test 10/21/23  1318 10/29/19  1849   AST 21 17   ALT 22 26   ALKPHOS 94 95   BILITOTAL 0.2 0.1     7-Day Micro Results       No results found for the last 168 hours.          Most Recent Urinalysis:  Recent Labs   Lab Test 10/21/23  1557 10/29/19  1938   COLOR Yellow Yellow   APPEARANCE Turbid* Clear   URINEGLC Negative Negative   URINEBILI Negative Negative   URINEKETONE Negative Negative   SG 1.026 1.020   UBLD 0.2 mg/dL* Negative   URINEPH 5.0 6.0   PROTEIN 20* Negative   UROBILINOGEN  --  <2.0 E.U./dL   NITRITE Negative Negative   LEUKEST 25 Dee/uL* Large*   RBCU 93* 0-2   WBCU 0 0-5   ,   Results for orders placed or performed during the hospital encounter of 10/21/23   Abdomen US, limited (RUQ only)    Narrative    EXAM: US ABDOMEN LIMITED  LOCATION: [facility]  DATE: 10/21/2023    INDICATION: RUQ pain after eating  COMPARISON: 10/29/2019  TECHNIQUE: Limited abdominal ultrasound.    FINDINGS:    GALLBLADDER: Multiple small stones. Otherwise normal. No wall thickening or pericholecystic free fluid. No focal tenderness, by sonographer report.    BILE DUCTS: No biliary dilatation. The common duct measures 6 mm.    LIVER: Mild diffuse fatty infiltration. 9 mm benign simple cyst.    RIGHT KIDNEY: No hydronephrosis.    PANCREAS: Normal where seen, though mostly obscured by bowel gas.    No ascites.      Impression    IMPRESSION:  Cholelithiasis without evidence of cholecystitis       CT Abdomen Pelvis w/o  Contrast    Narrative    EXAM: CT ABDOMEN PELVIS W/O CONTRAST  LOCATION: [facility]  DATE: 10/21/2023    INDICATION: R flank pain  COMPARISON: CT 10/29/2019  TECHNIQUE: CT scan of the abdomen and pelvis was performed without IV contrast. Multiplanar reformats were obtained. Dose reduction techniques were used.  CONTRAST: None.    FINDINGS:   LOWER CHEST: Unremarkable.    HEPATOBILIARY: Stable simple-appearing left hepatic cyst, no specific follow-up recommended. Cholelithiasis without evidence of acute cholecystitis or biliary obstruction.    PANCREAS: Normal.    SPLEEN: Normal.    ADRENAL GLANDS: Normal.    KIDNEYS/BLADDER: 3 mm obstructing stone at the right ureterovesicular junction with mild proximal hydroureteronephrosis. No nonobstructing renal stones bilaterally. Urinary bladder is decompressed but otherwise unremarkable.    BOWEL: No obstruction or inflammatory change. Normal appendix. Moderate volume formed stool in the colon.    LYMPH NODES: No suspicious lymphadenopathy.    VASCULATURE: Minimal calcified atherosclerosis.    PELVIC ORGANS: Hysterectomy.    MUSCULOSKELETAL: No acute bony abnormality. Prior fusion at L5/S1.        Impression    IMPRESSION:   1.  Obstructing 3 mm stone at the right ureterovesicular junction with mild proximal hydroureteronephrosis.  2.  Cholelithiasis without evidence of acute cholecystitis.         Discharge Medications   Current Discharge Medication List        START taking these medications    Details   docusate sodium (COLACE) 100 MG capsule Take 1 capsule (100 mg) by mouth 2 times daily  Qty: 30 capsule, Refills: 0    Associated Diagnoses: Acute cholecystitis      HYDROcodone-acetaminophen (NORCO) 5-325 MG tablet Take 1 tablet by mouth every 6 hours as needed for pain  Qty: 10 tablet, Refills: 0    Associated Diagnoses: Acute cholecystitis      tamsulosin (FLOMAX) 0.4 MG capsule Take 1 capsule (0.4 mg) by mouth daily  Qty: 30 capsule, Refills: 0     Associated Diagnoses: Hydronephrosis with urinary obstruction due to ureteral calculus           CONTINUE these medications which have NOT CHANGED    Details   ALPRAZolam (XANAX) 0.25 MG tablet Take 0.125-0.25 mg by mouth nightly as needed for anxiety      cyclobenzaprine (FLEXERIL) 5 MG tablet Take 10 mg by mouth at bedtime      DULoxetine (CYMBALTA) 60 MG capsule Take 60 mg by mouth at bedtime      gabapentin (NEURONTIN) 300 MG capsule Take 2 capsules by mouth 2 times daily      metoprolol succinate ER (TOPROL XL) 50 MG 24 hr tablet Take 50 mg by mouth at bedtime      phentermine (ADIPEX-P) 37.5 MG tablet Take 37.5 mg by mouth every morning (before breakfast)      TRULICITY 3 MG/0.5ML SOPN Inject 3 mg Subcutaneous once a week           Allergies   No Known Allergies

## 2023-10-23 NOTE — PLAN OF CARE
Goal Outcome Evaluation:    Pt received in bed, Aox4, on RA, VSS. POD1 lap kyrie, lap sites x5 CDI. Pt c/o generalized abd pain, received tylenol. Otherwise, up to bathroom independently. No stone in urine noted. Plan to get discharged home today. PIV removed. AVS reviewed with pt and a copy sent home with patient.     1115: Left on WC, NAD.

## 2023-10-23 NOTE — PROGRESS NOTES
General Surgery Progress Note:    Hospital Day # 0    ASSESSMENT:  1. Acute cholecystitis    2. Symptomatic cholelithiasis    3. Ureterolithiasis        Anastacio Powers is a 57 year old female who is s/p robot-assisted, laparoscopic cholecystectomy on 10/22/23 with Dr. Hyatt. Patient doing well post-operatively. Pain adequately controlled. Tolerating a regular diet without issues and passing flatus. Okay for discharge home from a surgical standpoint.    PLAN:  - Regular diet as tolerated  - Continue to increase activity and encourage ambulation to promote bowel function  - Okay for discharge from a surgical standpoint when deemed medically ready  - Discharge instructions and follow-up recommendations placed in AVS    SUBJECTIVE:   She is doing well. Pain controlled. Tolerated breakfast without nausea, vomiting. Denies fever, chills, nausea, vomiting. Passing flatus, no BM yet. Ambulating and voiding independently.      Patient Vitals for the past 24 hrs:   BP Temp Temp src Pulse Resp SpO2   10/23/23 0803 (P) 124/71 (P) 97.5  F (36.4  C) (P) Oral (P) 80 (P) 16 (P) 95 %   10/23/23 0420 104/61 98.1  F (36.7  C) Oral 74 16 94 %   10/22/23 2357 105/58 98  F (36.7  C) Oral 76 16 94 %   10/22/23 2030 124/66 98  F (36.7  C) Oral 86 16 96 %   10/22/23 1730 100/59 97.6  F (36.4  C) Oral 82 16 98 %   10/22/23 1630 106/53 97.9  F (36.6  C) Oral 78 16 96 %   10/22/23 1530 98/57 97.8  F (36.6  C) Oral 85 16 --   10/22/23 1500 100/59 97.9  F (36.6  C) Oral 82 16 92 %   10/22/23 1430 101/57 97.8  F (36.6  C) Oral 92 16 98 %   10/22/23 1420 102/56 97.4  F (36.3  C) -- 88 14 99 %   10/22/23 1414 -- -- -- -- -- 97 %   10/22/23 1410 104/55 97.2  F (36.2  C) -- 90 15 99 %   10/22/23 1400 104/55 98  F (36.7  C) -- 86 15 98 %   10/22/23 1350 109/59 97.9  F (36.6  C) -- -- -- 99 %   10/22/23 1346 -- 98  F (36.7  C) -- -- -- --   10/22/23 1340 115/63 -- -- 91 17 99 %   10/22/23 1330 118/62 97.9  F (36.6  C) Temporal 83 17 99 %   10/22/23 1150  115/65 98  F (36.7  C) Temporal 79 16 98 %         PHYSICAL EXAM:  General: patient seen resting in bed, no acute distress  Resp: no respiratory distress, breathing comfortably on room air  Abdomen: Softly distended, non-tender to palpation. No rebound or guarding. Incisions clean/dry/intact with overlying Dermabond.  Extremities: warm and well perfused    10/22 0700 - 10/23 0659  In: 542 [P.O.:320; I.V.:220]  Out: 1600 [Urine:1600]    Admission on 10/21/2023   Component Date Value    Sodium 10/21/2023 139     Potassium 10/21/2023 3.8     Carbon Dioxide (CO2) 10/21/2023 26     Anion Gap 10/21/2023 9     Urea Nitrogen 10/21/2023 16.3     Creatinine 10/21/2023 0.81     GFR Estimate 10/21/2023 84     Calcium 10/21/2023 9.5     Chloride 10/21/2023 104     Glucose 10/21/2023 77     Alkaline Phosphatase 10/21/2023 94     AST 10/21/2023 21     ALT 10/21/2023 22     Protein Total 10/21/2023 7.3     Albumin 10/21/2023 4.4     Bilirubin Total 10/21/2023 0.2     Lipase 10/21/2023 26     WBC Count 10/21/2023 5.9     RBC Count 10/21/2023 4.57     Hemoglobin 10/21/2023 11.9     Hematocrit 10/21/2023 38.3     MCV 10/21/2023 84     MCH 10/21/2023 26.0 (L)     MCHC 10/21/2023 31.1 (L)     RDW 10/21/2023 13.9     Platelet Count 10/21/2023 326     % Neutrophils 10/21/2023 67     % Lymphocytes 10/21/2023 24     % Monocytes 10/21/2023 5     % Eosinophils 10/21/2023 3     % Basophils 10/21/2023 1     % Immature Granulocytes 10/21/2023 0     NRBCs per 100 WBC 10/21/2023 0     Absolute Neutrophils 10/21/2023 4.0     Absolute Lymphocytes 10/21/2023 1.4     Absolute Monocytes 10/21/2023 0.3     Absolute Eosinophils 10/21/2023 0.2     Absolute Basophils 10/21/2023 0.1     Absolute Immature Granul* 10/21/2023 0.0     Absolute NRBCs 10/21/2023 0.0     Color Urine 10/21/2023 Yellow     Appearance Urine 10/21/2023 Turbid (A)     Glucose Urine 10/21/2023 Negative     Bilirubin Urine 10/21/2023 Negative     Ketones Urine 10/21/2023 Negative      Specific Gravity Urine 10/21/2023 1.026     Blood Urine 10/21/2023 0.2 mg/dL (A)     pH Urine 10/21/2023 5.0     Protein Albumin Urine 10/21/2023 20 (A)     Urobilinogen Urine 10/21/2023 <2.0     Nitrite Urine 10/21/2023 Negative     Leukocyte Esterase Urine 10/21/2023 25 Dee/uL (A)     Mucus Urine 10/21/2023 Present (A)     Uric Acid Crystals Urine 10/21/2023 Few (A)     RBC Urine 10/21/2023 93 (H)     WBC Urine 10/21/2023 0     Squamous Epithelials Uri* 10/21/2023 <1     Sodium 10/22/2023 139     Potassium 10/22/2023 4.4     Chloride 10/22/2023 108 (H)     Carbon Dioxide (CO2) 10/22/2023 25     Anion Gap 10/22/2023 6 (L)     Urea Nitrogen 10/22/2023 18.0     Creatinine 10/22/2023 0.78     GFR Estimate 10/22/2023 88     Calcium 10/22/2023 8.7     Glucose 10/22/2023 92     WBC Count 10/22/2023 4.0     RBC Count 10/22/2023 4.11     Hemoglobin 10/22/2023 10.6 (L)     Hematocrit 10/22/2023 34.9 (L)     MCV 10/22/2023 85     MCH 10/22/2023 25.8 (L)     MCHC 10/22/2023 30.4 (L)     RDW 10/22/2023 13.9     Platelet Count 10/22/2023 273     GLUCOSE BY METER POCT 10/22/2023 88     Platelet Count 10/22/2023 285     Hold Specimen 10/22/2023 JIC     Sodium 10/23/2023 140     Potassium 10/23/2023 4.1     Chloride 10/23/2023 107     Carbon Dioxide (CO2) 10/23/2023 27     Anion Gap 10/23/2023 6 (L)     Urea Nitrogen 10/23/2023 18.5     Creatinine 10/23/2023 0.74     GFR Estimate 10/23/2023 >90     Calcium 10/23/2023 8.2 (L)     Glucose 10/23/2023 96     Magnesium 10/23/2023 3.0 (H)     Phosphorus 10/23/2023 3.8     WBC Count 10/23/2023 6.2     RBC Count 10/23/2023 3.88     Hemoglobin 10/23/2023 10.0 (L)     Hematocrit 10/23/2023 32.8 (L)     MCV 10/23/2023 85     MCH 10/23/2023 25.8 (L)     MCHC 10/23/2023 30.5 (L)     RDW 10/23/2023 14.0     Platelet Count 10/23/2023 283         Cristina Davis PA-C  [facility]  [address] [place] [address]  [address]  [place] [address]

## 2023-10-23 NOTE — PLAN OF CARE
Problem: Pain Acute  Goal: Optimal Pain Control and Function  Intervention: Prevent or Manage Pain  Recent Flowsheet Documentation  Taken 10/22/2023 2015 by Samantha Montes RN  Medication Review/Management: medications reviewed     Problem: Pain Acute  Goal: Optimal Pain Control and Function  Intervention: Optimize Psychosocial Wellbeing  Recent Flowsheet Documentation  Taken 10/22/2023 2015 by Samantha Montes RN  Supportive Measures: active listening utilized   Goal Outcome Evaluation:    PRIMARY DIAGNOSIS: ACUTE PAIN  OUTPATIENT/OBSERVATION GOALS TO BE MET BEFORE DISCHARGE:  1. Pain Status: Improved-controlled with oral pain medications.    2. Return to near baseline physical activity: Yes    3. Cleared for discharge by consultants (if involved): Yes    Discharge Planner Nurse   Safe discharge environment identified: Yes  Barriers to discharge: No       Entered by: Samantha Montes RN 10/22/2023 9:22 PM     Please review provider order for any additional goals.   Nurse to notify provider when observation goals have been met and patient is ready for discharge.    Pt is alert and oriented x4. VSS on RA. Independent with cares and ADLs. Straining urine, no stone observed at this time. Incision sites are five in total- with liquid stitches, CARLOS, no discoloration, no swelling, and intact. Rates moderate pain level, pt stated that PRN oral Oxycodone is effective.

## 2023-10-24 LAB
PATH REPORT.COMMENTS IMP SPEC: NORMAL
PATH REPORT.COMMENTS IMP SPEC: NORMAL
PATH REPORT.FINAL DX SPEC: NORMAL
PATH REPORT.GROSS SPEC: NORMAL
PATH REPORT.MICROSCOPIC SPEC OTHER STN: NORMAL
PATH REPORT.RELEVANT HX SPEC: NORMAL
PHOTO IMAGE: NORMAL

## 2023-10-24 PROCEDURE — 88304 TISSUE EXAM BY PATHOLOGIST: CPT | Mod: 26 | Performed by: PATHOLOGY

## (undated) DEVICE — Device

## (undated) DEVICE — GLOVE UNDER INDICATOR PI SZ 7.0 LF 41670

## (undated) DEVICE — SUTURE VICRYL+ 4-0 UNDYED PS-2 VCP496H

## (undated) DEVICE — DECANTER VIAL 2006S

## (undated) DEVICE — SYR 50ML SLIP TIP W/O NDL 309654

## (undated) DEVICE — DAVINCI XI DRAPE ARM 470015

## (undated) DEVICE — SOL WATER IRRIG 1000ML BOTTLE 2F7114

## (undated) DEVICE — CUSTOM PACK LAP CHOLE SBA5BLCHEA

## (undated) DEVICE — LUBRICANT INST ELECTROLUBE EL101

## (undated) DEVICE — SUTURE VICRYL+ 0 27IN CT-1 UND VCP260H

## (undated) DEVICE — DRAPE SHEET REV FOLD 3/4 9349

## (undated) DEVICE — ENDO POUCH UNIVERSAL RETRIEVAL SYSTEM INZII 5MM CD003

## (undated) DEVICE — PLATE GROUNDING ADULT W/CORD 9165L

## (undated) DEVICE — PREP CHLORAPREP 26ML TINTED HI-LITE ORANGE 930815

## (undated) DEVICE — GOWN XLG DISP 9545

## (undated) DEVICE — CLIP ENDO HEMO-LOC PURPLE LG 544240

## (undated) DEVICE — DRAPE U SPLIT 74X120" 29440

## (undated) DEVICE — DAVINCI XI OBTURATOR BLADELESS 8MM 470359

## (undated) DEVICE — DAVINCI XI DRAPE COLUMN 470341

## (undated) DEVICE — NDL INSUFFLATION 13GA 120MM C2201

## (undated) DEVICE — BLADE KNIFE SURG 11 371111

## (undated) DEVICE — TUBING SMOKE EVAC PNEUMOCLEAR HIGH FLOW 0620050250

## (undated) DEVICE — SU VICRYL+ 3-0 27IN SH UND VCP416H

## (undated) DEVICE — DAVINCI XI SEAL UNIVERSAL 5-8MM 470361

## (undated) DEVICE — SU DERMABOND ADVANCED .7ML DNX12

## (undated) RX ORDER — PROPOFOL 10 MG/ML
INJECTION, EMULSION INTRAVENOUS
Status: DISPENSED
Start: 2023-10-22

## (undated) RX ORDER — DEXAMETHASONE SODIUM PHOSPHATE 4 MG/ML
INJECTION, SOLUTION INTRA-ARTICULAR; INTRALESIONAL; INTRAMUSCULAR; INTRAVENOUS; SOFT TISSUE
Status: DISPENSED
Start: 2023-10-22

## (undated) RX ORDER — KETOROLAC TROMETHAMINE 30 MG/ML
INJECTION, SOLUTION INTRAMUSCULAR; INTRAVENOUS
Status: DISPENSED
Start: 2023-10-22

## (undated) RX ORDER — FENTANYL CITRATE 50 UG/ML
INJECTION, SOLUTION INTRAMUSCULAR; INTRAVENOUS
Status: DISPENSED
Start: 2023-10-22

## (undated) RX ORDER — ONDANSETRON 2 MG/ML
INJECTION INTRAMUSCULAR; INTRAVENOUS
Status: DISPENSED
Start: 2023-10-22

## (undated) RX ORDER — LIDOCAINE HYDROCHLORIDE 10 MG/ML
INJECTION, SOLUTION EPIDURAL; INFILTRATION; INTRACAUDAL; PERINEURAL
Status: DISPENSED
Start: 2023-10-22